# Patient Record
Sex: MALE | Race: WHITE | NOT HISPANIC OR LATINO | Employment: OTHER | URBAN - NONMETROPOLITAN AREA
[De-identification: names, ages, dates, MRNs, and addresses within clinical notes are randomized per-mention and may not be internally consistent; named-entity substitution may affect disease eponyms.]

---

## 2020-07-31 ENCOUNTER — HOSPITAL ENCOUNTER (OUTPATIENT)
Dept: GENERAL RADIOLOGY | Facility: HOSPITAL | Age: 75
Discharge: HOME | End: 2020-07-31
Admitting: ORTHOPAEDIC SURGERY

## 2020-07-31 ENCOUNTER — TRANSCRIBE ORDERS (OUTPATIENT)
Dept: ADMINISTRATIVE | Facility: HOSPITAL | Age: 75
End: 2020-07-31

## 2020-07-31 ENCOUNTER — APPOINTMENT (OUTPATIENT)
Dept: PREADMISSION TESTING | Facility: HOSPITAL | Age: 75
End: 2020-07-31

## 2020-07-31 VITALS
HEART RATE: 74 BPM | RESPIRATION RATE: 18 BRPM | HEIGHT: 72 IN | SYSTOLIC BLOOD PRESSURE: 131 MMHG | WEIGHT: 309.53 LBS | BODY MASS INDEX: 41.92 KG/M2 | OXYGEN SATURATION: 95 % | DIASTOLIC BLOOD PRESSURE: 74 MMHG

## 2020-07-31 DIAGNOSIS — Z01.818 PRE-OP TESTING: Primary | ICD-10-CM

## 2020-07-31 LAB
ALBUMIN SERPL-MCNC: 4.4 G/DL (ref 3.5–5.2)
ALBUMIN/GLOB SERPL: 1.4 G/DL
ALP SERPL-CCNC: 44 U/L (ref 39–117)
ALT SERPL W P-5'-P-CCNC: 11 U/L (ref 1–41)
ANION GAP SERPL CALCULATED.3IONS-SCNC: 12 MMOL/L (ref 5–15)
APTT PPP: 37.7 SECONDS (ref 24.1–35)
AST SERPL-CCNC: 20 U/L (ref 1–40)
BACTERIA UR QL AUTO: ABNORMAL /HPF
BILIRUB SERPL-MCNC: 0.5 MG/DL (ref 0–1.2)
BILIRUB UR QL STRIP: NEGATIVE
BUN SERPL-MCNC: 14 MG/DL (ref 8–23)
BUN/CREAT SERPL: 10.4 (ref 7–25)
CALCIUM SPEC-SCNC: 9.8 MG/DL (ref 8.6–10.5)
CHLORIDE SERPL-SCNC: 100 MMOL/L (ref 98–107)
CLARITY UR: CLEAR
CO2 SERPL-SCNC: 31 MMOL/L (ref 22–29)
COLOR UR: YELLOW
CREAT SERPL-MCNC: 1.35 MG/DL (ref 0.76–1.27)
DEPRECATED RDW RBC AUTO: 43.1 FL (ref 37–54)
ERYTHROCYTE [DISTWIDTH] IN BLOOD BY AUTOMATED COUNT: 12.4 % (ref 12.3–15.4)
GFR SERPL CREATININE-BSD FRML MDRD: 52 ML/MIN/1.73
GLOBULIN UR ELPH-MCNC: 3.2 GM/DL
GLUCOSE SERPL-MCNC: 127 MG/DL (ref 65–99)
GLUCOSE UR STRIP-MCNC: NEGATIVE MG/DL
HCT VFR BLD AUTO: 41.8 % (ref 37.5–51)
HGB BLD-MCNC: 13.3 G/DL (ref 13–17.7)
HGB UR QL STRIP.AUTO: ABNORMAL
HYALINE CASTS UR QL AUTO: ABNORMAL /LPF
INR PPP: 1.91 (ref 0.91–1.09)
KETONES UR QL STRIP: NEGATIVE
LEUKOCYTE ESTERASE UR QL STRIP.AUTO: ABNORMAL
MCH RBC QN AUTO: 30.1 PG (ref 26.6–33)
MCHC RBC AUTO-ENTMCNC: 31.8 G/DL (ref 31.5–35.7)
MCV RBC AUTO: 94.6 FL (ref 79–97)
NITRITE UR QL STRIP: NEGATIVE
PH UR STRIP.AUTO: 6.5 [PH] (ref 5–8)
PLATELET # BLD AUTO: 265 10*3/MM3 (ref 140–450)
PMV BLD AUTO: 11.4 FL (ref 6–12)
POTASSIUM SERPL-SCNC: 4.4 MMOL/L (ref 3.5–5.2)
PROT SERPL-MCNC: 7.6 G/DL (ref 6–8.5)
PROT UR QL STRIP: NEGATIVE
PROTHROMBIN TIME: 21.7 SECONDS (ref 11.9–14.6)
RBC # BLD AUTO: 4.42 10*6/MM3 (ref 4.14–5.8)
RBC # UR: ABNORMAL /HPF
REF LAB TEST METHOD: ABNORMAL
SODIUM SERPL-SCNC: 143 MMOL/L (ref 136–145)
SP GR UR STRIP: 1.01 (ref 1–1.03)
SQUAMOUS #/AREA URNS HPF: ABNORMAL /HPF
UROBILINOGEN UR QL STRIP: ABNORMAL
WBC # BLD AUTO: 11.87 10*3/MM3 (ref 3.4–10.8)
WBC UR QL AUTO: ABNORMAL /HPF

## 2020-07-31 PROCEDURE — 80053 COMPREHEN METABOLIC PANEL: CPT | Performed by: ORTHOPAEDIC SURGERY

## 2020-07-31 PROCEDURE — 85730 THROMBOPLASTIN TIME PARTIAL: CPT | Performed by: ORTHOPAEDIC SURGERY

## 2020-07-31 PROCEDURE — 87077 CULTURE AEROBIC IDENTIFY: CPT | Performed by: ORTHOPAEDIC SURGERY

## 2020-07-31 PROCEDURE — 81001 URINALYSIS AUTO W/SCOPE: CPT | Performed by: ORTHOPAEDIC SURGERY

## 2020-07-31 PROCEDURE — 85027 COMPLETE CBC AUTOMATED: CPT | Performed by: ORTHOPAEDIC SURGERY

## 2020-07-31 PROCEDURE — 87186 SC STD MICRODIL/AGAR DIL: CPT | Performed by: ORTHOPAEDIC SURGERY

## 2020-07-31 PROCEDURE — 87086 URINE CULTURE/COLONY COUNT: CPT | Performed by: ORTHOPAEDIC SURGERY

## 2020-07-31 PROCEDURE — 85610 PROTHROMBIN TIME: CPT | Performed by: ORTHOPAEDIC SURGERY

## 2020-07-31 PROCEDURE — 71045 X-RAY EXAM CHEST 1 VIEW: CPT

## 2020-07-31 PROCEDURE — 93010 ELECTROCARDIOGRAM REPORT: CPT | Performed by: INTERNAL MEDICINE

## 2020-07-31 PROCEDURE — 36415 COLL VENOUS BLD VENIPUNCTURE: CPT

## 2020-07-31 PROCEDURE — 93005 ELECTROCARDIOGRAM TRACING: CPT

## 2020-07-31 RX ORDER — ASCORBIC ACID 500 MG
500 TABLET ORAL DAILY
COMMUNITY

## 2020-07-31 RX ORDER — POTASSIUM CHLORIDE 1.5 G/1.77G
20 POWDER, FOR SOLUTION ORAL DAILY
COMMUNITY

## 2020-07-31 RX ORDER — FUROSEMIDE 40 MG/1
40 TABLET ORAL 2 TIMES DAILY
COMMUNITY

## 2020-07-31 RX ORDER — GUAIFENESIN 600 MG/1
1200 TABLET, EXTENDED RELEASE ORAL DAILY
COMMUNITY

## 2020-07-31 RX ORDER — LEVOTHYROXINE SODIUM 175 UG/1
175 TABLET ORAL DAILY
COMMUNITY

## 2020-07-31 RX ORDER — GLIPIZIDE 5 MG/1
5 TABLET ORAL DAILY
COMMUNITY

## 2020-07-31 RX ORDER — MONTELUKAST SODIUM 10 MG/1
10 TABLET ORAL NIGHTLY
COMMUNITY

## 2020-07-31 RX ORDER — ASPIRIN 81 MG/1
81 TABLET, CHEWABLE ORAL DAILY
COMMUNITY

## 2020-07-31 RX ORDER — WARFARIN SODIUM 5 MG/1
5 TABLET ORAL
COMMUNITY

## 2020-07-31 RX ORDER — ATORVASTATIN CALCIUM 10 MG/1
10 TABLET, FILM COATED ORAL DAILY
COMMUNITY

## 2020-07-31 RX ORDER — METOPROLOL SUCCINATE 100 MG/1
100 TABLET, EXTENDED RELEASE ORAL 2 TIMES DAILY
COMMUNITY

## 2020-07-31 RX ORDER — ERGOCALCIFEROL 1.25 MG/1
50000 CAPSULE ORAL WEEKLY
COMMUNITY

## 2020-07-31 RX ORDER — AMOXICILLIN 500 MG
1200 CAPSULE ORAL DAILY
COMMUNITY

## 2020-07-31 RX ORDER — OXYCODONE HYDROCHLORIDE AND ACETAMINOPHEN 5; 325 MG/1; MG/1
1 TABLET ORAL EVERY 6 HOURS PRN
COMMUNITY
End: 2020-08-09 | Stop reason: HOSPADM

## 2020-08-02 LAB — BACTERIA SPEC AEROBE CULT: ABNORMAL

## 2020-08-04 ENCOUNTER — LAB (OUTPATIENT)
Dept: LAB | Facility: HOSPITAL | Age: 75
End: 2020-08-04

## 2020-08-04 PROCEDURE — C9803 HOPD COVID-19 SPEC COLLECT: HCPCS | Performed by: ORTHOPAEDIC SURGERY

## 2020-08-04 PROCEDURE — U0003 INFECTIOUS AGENT DETECTION BY NUCLEIC ACID (DNA OR RNA); SEVERE ACUTE RESPIRATORY SYNDROME CORONAVIRUS 2 (SARS-COV-2) (CORONAVIRUS DISEASE [COVID-19]), AMPLIFIED PROBE TECHNIQUE, MAKING USE OF HIGH THROUGHPUT TECHNOLOGIES AS DESCRIBED BY CMS-2020-01-R: HCPCS | Performed by: ORTHOPAEDIC SURGERY

## 2020-08-05 LAB
COVID LABCORP PRIORITY: NORMAL
SARS-COV-2 RNA RESP QL NAA+PROBE: NOT DETECTED

## 2020-08-07 ENCOUNTER — ANESTHESIA EVENT (OUTPATIENT)
Dept: PERIOP | Facility: HOSPITAL | Age: 75
End: 2020-08-07

## 2020-08-07 ENCOUNTER — ANESTHESIA (OUTPATIENT)
Dept: PERIOP | Facility: HOSPITAL | Age: 75
End: 2020-08-07

## 2020-08-07 ENCOUNTER — APPOINTMENT (OUTPATIENT)
Dept: GENERAL RADIOLOGY | Facility: HOSPITAL | Age: 75
End: 2020-08-07

## 2020-08-07 ENCOUNTER — HOSPITAL ENCOUNTER (INPATIENT)
Facility: HOSPITAL | Age: 75
LOS: 2 days | Discharge: HOME | End: 2020-08-09
Attending: ORTHOPAEDIC SURGERY | Admitting: ORTHOPAEDIC SURGERY

## 2020-08-07 DIAGNOSIS — M54.50 LUMBAR BACK PAIN: Primary | ICD-10-CM

## 2020-08-07 DIAGNOSIS — Z78.9 IMPAIRED MOBILITY AND ADLS: ICD-10-CM

## 2020-08-07 DIAGNOSIS — Z74.09 IMPAIRED MOBILITY AND ADLS: ICD-10-CM

## 2020-08-07 DIAGNOSIS — R26.9 GAIT ABNORMALITY: ICD-10-CM

## 2020-08-07 PROBLEM — M54.16 LUMBAR RADICULOPATHY: Status: ACTIVE | Noted: 2020-08-07

## 2020-08-07 PROBLEM — M51.36 DDD (DEGENERATIVE DISC DISEASE), LUMBAR: Status: ACTIVE | Noted: 2020-08-07

## 2020-08-07 LAB
ABO GROUP BLD: NORMAL
BLD GP AB SCN SERPL QL: NEGATIVE
GLUCOSE BLDC GLUCOMTR-MCNC: 135 MG/DL (ref 70–130)
INR PPP: 1.27 (ref 0.91–1.09)
PROTHROMBIN TIME: 15.5 SECONDS (ref 11.9–14.6)
RH BLD: NEGATIVE
T&S EXPIRATION DATE: NORMAL

## 2020-08-07 PROCEDURE — 76000 FLUOROSCOPY <1 HR PHYS/QHP: CPT

## 2020-08-07 PROCEDURE — 25010000002 FENTANYL CITRATE (PF) 100 MCG/2ML SOLUTION: Performed by: ANESTHESIOLOGY

## 2020-08-07 PROCEDURE — C1713 ANCHOR/SCREW BN/BN,TIS/BN: HCPCS | Performed by: ORTHOPAEDIC SURGERY

## 2020-08-07 PROCEDURE — 0SG00AJ FUSION OF LUMBAR VERTEBRAL JOINT WITH INTERBODY FUSION DEVICE, POSTERIOR APPROACH, ANTERIOR COLUMN, OPEN APPROACH: ICD-10-PCS | Performed by: ORTHOPAEDIC SURGERY

## 2020-08-07 PROCEDURE — 25010000002 HYDROMORPHONE PER 4 MG: Performed by: ANESTHESIOLOGY

## 2020-08-07 PROCEDURE — 25010000002 CEFAZOLIN PER 500 MG: Performed by: PHYSICIAN ASSISTANT

## 2020-08-07 PROCEDURE — 25010000002 PHENYLEPHRINE HCL 0.8 MG/10ML SOLUTION PREFILLED SYRINGE: Performed by: NURSE ANESTHETIST, CERTIFIED REGISTERED

## 2020-08-07 PROCEDURE — 86900 BLOOD TYPING SEROLOGIC ABO: CPT | Performed by: ORTHOPAEDIC SURGERY

## 2020-08-07 PROCEDURE — 72100 X-RAY EXAM L-S SPINE 2/3 VWS: CPT

## 2020-08-07 PROCEDURE — 86901 BLOOD TYPING SEROLOGIC RH(D): CPT | Performed by: ORTHOPAEDIC SURGERY

## 2020-08-07 PROCEDURE — 25010000002 PROPOFOL 10 MG/ML EMULSION: Performed by: NURSE ANESTHETIST, CERTIFIED REGISTERED

## 2020-08-07 PROCEDURE — 86850 RBC ANTIBODY SCREEN: CPT | Performed by: ORTHOPAEDIC SURGERY

## 2020-08-07 PROCEDURE — 25010000003 HYDROMORPHONE 1 MG/ML SOLUTION: Performed by: ORTHOPAEDIC SURGERY

## 2020-08-07 PROCEDURE — 0ST20ZZ RESECTION OF LUMBAR VERTEBRAL DISC, OPEN APPROACH: ICD-10-PCS | Performed by: ORTHOPAEDIC SURGERY

## 2020-08-07 PROCEDURE — 0SG0071 FUSION OF LUMBAR VERTEBRAL JOINT WITH AUTOLOGOUS TISSUE SUBSTITUTE, POSTERIOR APPROACH, POSTERIOR COLUMN, OPEN APPROACH: ICD-10-PCS | Performed by: ORTHOPAEDIC SURGERY

## 2020-08-07 PROCEDURE — 85610 PROTHROMBIN TIME: CPT | Performed by: ORTHOPAEDIC SURGERY

## 2020-08-07 PROCEDURE — 82962 GLUCOSE BLOOD TEST: CPT

## 2020-08-07 PROCEDURE — 25010000002 ONDANSETRON PER 1 MG: Performed by: NURSE ANESTHETIST, CERTIFIED REGISTERED

## 2020-08-07 PROCEDURE — C1769 GUIDE WIRE: HCPCS | Performed by: ORTHOPAEDIC SURGERY

## 2020-08-07 PROCEDURE — 25010000002 ONDANSETRON PER 1 MG: Performed by: ANESTHESIOLOGY

## 2020-08-07 PROCEDURE — 94799 UNLISTED PULMONARY SVC/PX: CPT

## 2020-08-07 DEVICE — SET SCREW
Type: IMPLANTABLE DEVICE | Site: SPINE LUMBAR | Status: FUNCTIONAL
Brand: INVICTUS

## 2020-08-07 DEVICE — TI MIS LORDOTIC ROD, 5.5 MM X 40 MM
Type: IMPLANTABLE DEVICE | Site: SPINE LUMBAR | Status: FUNCTIONAL
Brand: INVICTUS

## 2020-08-07 DEVICE — ALLOGRFT FLD BIOBURST 1ML: Type: IMPLANTABLE DEVICE | Site: SPINE LUMBAR | Status: FUNCTIONAL

## 2020-08-07 DEVICE — CANNULATED EXTENDED TAB POLYAXIAL REDUCTION SCREW, 6.5 MM X 55 MM
Type: IMPLANTABLE DEVICE | Site: SPINE LUMBAR | Status: FUNCTIONAL
Brand: INVICTUS

## 2020-08-07 DEVICE — IMPLANTABLE DEVICE: Type: IMPLANTABLE DEVICE | Site: SPINE LUMBAR | Status: FUNCTIONAL

## 2020-08-07 DEVICE — BONE FILLER VOID NANOSS BIOACTIVE 3D 20CC: Type: IMPLANTABLE DEVICE | Site: SPINE LUMBAR | Status: FUNCTIONAL

## 2020-08-07 RX ORDER — SODIUM CHLORIDE 9 MG/ML
INJECTION, SOLUTION INTRAVENOUS AS NEEDED
Status: DISCONTINUED | OUTPATIENT
Start: 2020-08-07 | End: 2020-08-09 | Stop reason: HOSPADM

## 2020-08-07 RX ORDER — HYDROMORPHONE HYDROCHLORIDE 1 MG/ML
0.25 INJECTION, SOLUTION INTRAMUSCULAR; INTRAVENOUS; SUBCUTANEOUS
Status: DISCONTINUED | OUTPATIENT
Start: 2020-08-07 | End: 2020-08-07 | Stop reason: HOSPADM

## 2020-08-07 RX ORDER — PHENYLEPHRINE HCL IN 0.9% NACL 0.8MG/10ML
SYRINGE (ML) INTRAVENOUS AS NEEDED
Status: DISCONTINUED | OUTPATIENT
Start: 2020-08-07 | End: 2020-08-07 | Stop reason: SURG

## 2020-08-07 RX ORDER — ROCURONIUM BROMIDE 10 MG/ML
INJECTION, SOLUTION INTRAVENOUS AS NEEDED
Status: DISCONTINUED | OUTPATIENT
Start: 2020-08-07 | End: 2020-08-07 | Stop reason: SURG

## 2020-08-07 RX ORDER — SODIUM CHLORIDE 0.9 % (FLUSH) 0.9 %
3 SYRINGE (ML) INJECTION EVERY 12 HOURS SCHEDULED
Status: DISCONTINUED | OUTPATIENT
Start: 2020-08-07 | End: 2020-08-07 | Stop reason: HOSPADM

## 2020-08-07 RX ORDER — POTASSIUM CHLORIDE 1.5 G/1.77G
20 POWDER, FOR SOLUTION ORAL DAILY
Status: DISCONTINUED | OUTPATIENT
Start: 2020-08-07 | End: 2020-08-09 | Stop reason: HOSPADM

## 2020-08-07 RX ORDER — SODIUM CHLORIDE, SODIUM LACTATE, POTASSIUM CHLORIDE, CALCIUM CHLORIDE 600; 310; 30; 20 MG/100ML; MG/100ML; MG/100ML; MG/100ML
1000 INJECTION, SOLUTION INTRAVENOUS CONTINUOUS
Status: DISCONTINUED | OUTPATIENT
Start: 2020-08-07 | End: 2020-08-07 | Stop reason: HOSPADM

## 2020-08-07 RX ORDER — ONDANSETRON 2 MG/ML
4 INJECTION INTRAMUSCULAR; INTRAVENOUS EVERY 6 HOURS PRN
Status: DISCONTINUED | OUTPATIENT
Start: 2020-08-07 | End: 2020-08-09 | Stop reason: HOSPADM

## 2020-08-07 RX ORDER — MIDAZOLAM HYDROCHLORIDE 1 MG/ML
1 INJECTION INTRAMUSCULAR; INTRAVENOUS
Status: DISCONTINUED | OUTPATIENT
Start: 2020-08-07 | End: 2020-08-07 | Stop reason: HOSPADM

## 2020-08-07 RX ORDER — MONTELUKAST SODIUM 10 MG/1
10 TABLET ORAL NIGHTLY
Status: DISCONTINUED | OUTPATIENT
Start: 2020-08-07 | End: 2020-08-09 | Stop reason: HOSPADM

## 2020-08-07 RX ORDER — ACETAMINOPHEN 500 MG
1000 TABLET ORAL ONCE
Status: COMPLETED | OUTPATIENT
Start: 2020-08-07 | End: 2020-08-07

## 2020-08-07 RX ORDER — OXYCODONE AND ACETAMINOPHEN 10; 325 MG/1; MG/1
1 TABLET ORAL ONCE AS NEEDED
Status: DISCONTINUED | OUTPATIENT
Start: 2020-08-07 | End: 2020-08-07 | Stop reason: HOSPADM

## 2020-08-07 RX ORDER — METOPROLOL SUCCINATE 100 MG/1
100 TABLET, EXTENDED RELEASE ORAL 2 TIMES DAILY
Status: DISCONTINUED | OUTPATIENT
Start: 2020-08-07 | End: 2020-08-09 | Stop reason: HOSPADM

## 2020-08-07 RX ORDER — PROPOFOL 10 MG/ML
VIAL (ML) INTRAVENOUS AS NEEDED
Status: DISCONTINUED | OUTPATIENT
Start: 2020-08-07 | End: 2020-08-07 | Stop reason: SURG

## 2020-08-07 RX ORDER — CEFAZOLIN SODIUM IN 0.9 % NACL 3 G/100 ML
3 INTRAVENOUS SOLUTION, PIGGYBACK (ML) INTRAVENOUS ONCE
Status: COMPLETED | OUTPATIENT
Start: 2020-08-07 | End: 2020-08-07

## 2020-08-07 RX ORDER — OXYCODONE AND ACETAMINOPHEN 7.5; 325 MG/1; MG/1
2 TABLET ORAL ONCE AS NEEDED
Status: DISCONTINUED | OUTPATIENT
Start: 2020-08-07 | End: 2020-08-07 | Stop reason: HOSPADM

## 2020-08-07 RX ORDER — PROMETHAZINE HYDROCHLORIDE 25 MG/ML
12.5 INJECTION, SOLUTION INTRAMUSCULAR; INTRAVENOUS EVERY 6 HOURS PRN
Status: DISCONTINUED | OUTPATIENT
Start: 2020-08-07 | End: 2020-08-09 | Stop reason: HOSPADM

## 2020-08-07 RX ORDER — FLUMAZENIL 0.1 MG/ML
0.2 INJECTION INTRAVENOUS AS NEEDED
Status: DISCONTINUED | OUTPATIENT
Start: 2020-08-07 | End: 2020-08-07 | Stop reason: HOSPADM

## 2020-08-07 RX ORDER — EPHEDRINE SULFATE 50 MG/ML
INJECTION, SOLUTION INTRAVENOUS AS NEEDED
Status: DISCONTINUED | OUTPATIENT
Start: 2020-08-07 | End: 2020-08-07 | Stop reason: SURG

## 2020-08-07 RX ORDER — MAGNESIUM HYDROXIDE 1200 MG/15ML
LIQUID ORAL AS NEEDED
Status: DISCONTINUED | OUTPATIENT
Start: 2020-08-07 | End: 2020-08-09 | Stop reason: HOSPADM

## 2020-08-07 RX ORDER — ACETAMINOPHEN 325 MG/1
650 TABLET ORAL EVERY 4 HOURS PRN
Status: DISCONTINUED | OUTPATIENT
Start: 2020-08-07 | End: 2020-08-09 | Stop reason: HOSPADM

## 2020-08-07 RX ORDER — AMOXICILLIN 250 MG
1 CAPSULE ORAL 2 TIMES DAILY
Status: DISCONTINUED | OUTPATIENT
Start: 2020-08-07 | End: 2020-08-09 | Stop reason: HOSPADM

## 2020-08-07 RX ORDER — ONDANSETRON 4 MG/1
4 TABLET, FILM COATED ORAL EVERY 6 HOURS PRN
Status: DISCONTINUED | OUTPATIENT
Start: 2020-08-07 | End: 2020-08-09 | Stop reason: HOSPADM

## 2020-08-07 RX ORDER — SODIUM CHLORIDE 0.9 % (FLUSH) 0.9 %
10 SYRINGE (ML) INJECTION AS NEEDED
Status: DISCONTINUED | OUTPATIENT
Start: 2020-08-07 | End: 2020-08-07 | Stop reason: HOSPADM

## 2020-08-07 RX ORDER — SODIUM CHLORIDE 0.9 % (FLUSH) 0.9 %
3 SYRINGE (ML) INJECTION AS NEEDED
Status: DISCONTINUED | OUTPATIENT
Start: 2020-08-07 | End: 2020-08-07 | Stop reason: HOSPADM

## 2020-08-07 RX ORDER — SODIUM CHLORIDE, SODIUM LACTATE, POTASSIUM CHLORIDE, CALCIUM CHLORIDE 600; 310; 30; 20 MG/100ML; MG/100ML; MG/100ML; MG/100ML
100 INJECTION, SOLUTION INTRAVENOUS CONTINUOUS PRN
Status: DISCONTINUED | OUTPATIENT
Start: 2020-08-07 | End: 2020-08-07 | Stop reason: HOSPADM

## 2020-08-07 RX ORDER — FUROSEMIDE 40 MG/1
40 TABLET ORAL
Status: DISCONTINUED | OUTPATIENT
Start: 2020-08-07 | End: 2020-08-09 | Stop reason: HOSPADM

## 2020-08-07 RX ORDER — OXYCODONE AND ACETAMINOPHEN 7.5; 325 MG/1; MG/1
2 TABLET ORAL EVERY 4 HOURS PRN
Status: DISCONTINUED | OUTPATIENT
Start: 2020-08-07 | End: 2020-08-09 | Stop reason: HOSPADM

## 2020-08-07 RX ORDER — OXYCODONE HCL 20 MG/1
20 TABLET, FILM COATED, EXTENDED RELEASE ORAL ONCE
Status: COMPLETED | OUTPATIENT
Start: 2020-08-07 | End: 2020-08-07

## 2020-08-07 RX ORDER — NALOXONE HCL 0.4 MG/ML
0.04 VIAL (ML) INJECTION AS NEEDED
Status: DISCONTINUED | OUTPATIENT
Start: 2020-08-07 | End: 2020-08-07 | Stop reason: HOSPADM

## 2020-08-07 RX ORDER — SODIUM CHLORIDE 0.9 % (FLUSH) 0.9 %
3-10 SYRINGE (ML) INJECTION AS NEEDED
Status: DISCONTINUED | OUTPATIENT
Start: 2020-08-07 | End: 2020-08-07 | Stop reason: HOSPADM

## 2020-08-07 RX ORDER — FENTANYL CITRATE 50 UG/ML
25 INJECTION, SOLUTION INTRAMUSCULAR; INTRAVENOUS
Status: DISCONTINUED | OUTPATIENT
Start: 2020-08-07 | End: 2020-08-07 | Stop reason: HOSPADM

## 2020-08-07 RX ORDER — NALOXONE HCL 0.4 MG/ML
0.4 VIAL (ML) INJECTION
Status: DISCONTINUED | OUTPATIENT
Start: 2020-08-07 | End: 2020-08-09 | Stop reason: HOSPADM

## 2020-08-07 RX ORDER — SODIUM CHLORIDE 0.9 % (FLUSH) 0.9 %
10 SYRINGE (ML) INJECTION AS NEEDED
Status: DISCONTINUED | OUTPATIENT
Start: 2020-08-07 | End: 2020-08-09 | Stop reason: HOSPADM

## 2020-08-07 RX ORDER — OXYCODONE AND ACETAMINOPHEN 7.5; 325 MG/1; MG/1
1 TABLET ORAL EVERY 4 HOURS PRN
Status: DISCONTINUED | OUTPATIENT
Start: 2020-08-07 | End: 2020-08-09 | Stop reason: HOSPADM

## 2020-08-07 RX ORDER — SODIUM CHLORIDE, SODIUM LACTATE, POTASSIUM CHLORIDE, CALCIUM CHLORIDE 600; 310; 30; 20 MG/100ML; MG/100ML; MG/100ML; MG/100ML
100 INJECTION, SOLUTION INTRAVENOUS CONTINUOUS
Status: DISCONTINUED | OUTPATIENT
Start: 2020-08-07 | End: 2020-08-07 | Stop reason: HOSPADM

## 2020-08-07 RX ORDER — BUPIVACAINE HCL/0.9 % NACL/PF 0.1 %
2 PLASTIC BAG, INJECTION (ML) EPIDURAL EVERY 8 HOURS
Status: COMPLETED | OUTPATIENT
Start: 2020-08-07 | End: 2020-08-08

## 2020-08-07 RX ORDER — ASCORBIC ACID 500 MG
1000 TABLET ORAL DAILY
Status: DISCONTINUED | OUTPATIENT
Start: 2020-08-07 | End: 2020-08-09 | Stop reason: HOSPADM

## 2020-08-07 RX ORDER — ONDANSETRON 2 MG/ML
4 INJECTION INTRAMUSCULAR; INTRAVENOUS AS NEEDED
Status: DISCONTINUED | OUTPATIENT
Start: 2020-08-07 | End: 2020-08-07 | Stop reason: HOSPADM

## 2020-08-07 RX ORDER — LIDOCAINE HYDROCHLORIDE 10 MG/ML
0.5 INJECTION, SOLUTION EPIDURAL; INFILTRATION; INTRACAUDAL; PERINEURAL ONCE AS NEEDED
Status: DISCONTINUED | OUTPATIENT
Start: 2020-08-07 | End: 2020-08-07 | Stop reason: HOSPADM

## 2020-08-07 RX ORDER — DEXAMETHASONE SODIUM PHOSPHATE 4 MG/ML
INJECTION, SOLUTION INTRA-ARTICULAR; INTRALESIONAL; INTRAMUSCULAR; INTRAVENOUS; SOFT TISSUE AS NEEDED
Status: DISCONTINUED | OUTPATIENT
Start: 2020-08-07 | End: 2020-08-07

## 2020-08-07 RX ORDER — LABETALOL HYDROCHLORIDE 5 MG/ML
5 INJECTION, SOLUTION INTRAVENOUS
Status: DISCONTINUED | OUTPATIENT
Start: 2020-08-07 | End: 2020-08-07 | Stop reason: HOSPADM

## 2020-08-07 RX ORDER — SUFENTANIL CITRATE 50 UG/ML
INJECTION EPIDURAL; INTRAVENOUS AS NEEDED
Status: DISCONTINUED | OUTPATIENT
Start: 2020-08-07 | End: 2020-08-07 | Stop reason: SURG

## 2020-08-07 RX ORDER — ATORVASTATIN CALCIUM 10 MG/1
10 TABLET, FILM COATED ORAL NIGHTLY
Status: DISCONTINUED | OUTPATIENT
Start: 2020-08-07 | End: 2020-08-09 | Stop reason: HOSPADM

## 2020-08-07 RX ORDER — SODIUM CHLORIDE 9 MG/ML
100 INJECTION, SOLUTION INTRAVENOUS CONTINUOUS
Status: DISCONTINUED | OUTPATIENT
Start: 2020-08-07 | End: 2020-08-09 | Stop reason: HOSPADM

## 2020-08-07 RX ORDER — GLIPIZIDE 5 MG/1
5 TABLET ORAL DAILY
Status: DISCONTINUED | OUTPATIENT
Start: 2020-08-07 | End: 2020-08-09 | Stop reason: HOSPADM

## 2020-08-07 RX ORDER — SODIUM CHLORIDE 0.9 % (FLUSH) 0.9 %
3 SYRINGE (ML) INJECTION EVERY 12 HOURS SCHEDULED
Status: DISCONTINUED | OUTPATIENT
Start: 2020-08-07 | End: 2020-08-09 | Stop reason: HOSPADM

## 2020-08-07 RX ORDER — DIAZEPAM 5 MG/1
5 TABLET ORAL EVERY 6 HOURS PRN
Status: DISCONTINUED | OUTPATIENT
Start: 2020-08-07 | End: 2020-08-09 | Stop reason: HOSPADM

## 2020-08-07 RX ORDER — SODIUM CHLORIDE 0.9 % (FLUSH) 0.9 %
10 SYRINGE (ML) INJECTION EVERY 12 HOURS SCHEDULED
Status: DISCONTINUED | OUTPATIENT
Start: 2020-08-07 | End: 2020-08-07 | Stop reason: HOSPADM

## 2020-08-07 RX ORDER — ONDANSETRON 2 MG/ML
INJECTION INTRAMUSCULAR; INTRAVENOUS AS NEEDED
Status: DISCONTINUED | OUTPATIENT
Start: 2020-08-07 | End: 2020-08-07 | Stop reason: SURG

## 2020-08-07 RX ORDER — GUAIFENESIN 600 MG/1
1200 TABLET, EXTENDED RELEASE ORAL DAILY
Status: DISCONTINUED | OUTPATIENT
Start: 2020-08-07 | End: 2020-08-09 | Stop reason: HOSPADM

## 2020-08-07 RX ADMIN — SODIUM CHLORIDE 100 ML/HR: 9 INJECTION, SOLUTION INTRAVENOUS at 17:17

## 2020-08-07 RX ADMIN — ONDANSETRON HYDROCHLORIDE 4 MG: 2 SOLUTION INTRAMUSCULAR; INTRAVENOUS at 16:02

## 2020-08-07 RX ADMIN — FENTANYL CITRATE 25 MCG: 50 INJECTION, SOLUTION INTRAMUSCULAR; INTRAVENOUS at 16:07

## 2020-08-07 RX ADMIN — Medication 160 MCG: at 12:14

## 2020-08-07 RX ADMIN — HYDROMORPHONE HYDROCHLORIDE 1 MG: 1 INJECTION, SOLUTION INTRAMUSCULAR; INTRAVENOUS; SUBCUTANEOUS at 20:18

## 2020-08-07 RX ADMIN — CEFAZOLIN 3 G: 1 INJECTION, POWDER, FOR SOLUTION INTRAMUSCULAR; INTRAVENOUS; PARENTERAL at 12:42

## 2020-08-07 RX ADMIN — LIDOCAINE HYDROCHLORIDE 100 MG: 20 INJECTION, SOLUTION INTRAVENOUS at 12:12

## 2020-08-07 RX ADMIN — FENTANYL CITRATE 25 MCG: 50 INJECTION, SOLUTION INTRAMUSCULAR; INTRAVENOUS at 16:02

## 2020-08-07 RX ADMIN — HYDROMORPHONE HYDROCHLORIDE 1 MG: 1 INJECTION, SOLUTION INTRAMUSCULAR; INTRAVENOUS; SUBCUTANEOUS at 17:52

## 2020-08-07 RX ADMIN — SUFENTANIL CITRATE 10 MCG: 50 INJECTION EPIDURAL; INTRAVENOUS at 13:17

## 2020-08-07 RX ADMIN — ROCURONIUM BROMIDE 20 MG: 10 INJECTION INTRAVENOUS at 12:57

## 2020-08-07 RX ADMIN — METOPROLOL SUCCINATE 100 MG: 100 TABLET, EXTENDED RELEASE ORAL at 20:17

## 2020-08-07 RX ADMIN — FENTANYL CITRATE 25 MCG: 50 INJECTION, SOLUTION INTRAMUSCULAR; INTRAVENOUS at 16:22

## 2020-08-07 RX ADMIN — ATORVASTATIN CALCIUM 10 MG: 10 TABLET, FILM COATED ORAL at 20:17

## 2020-08-07 RX ADMIN — SUFENTANIL CITRATE 20 MCG: 50 INJECTION EPIDURAL; INTRAVENOUS at 13:21

## 2020-08-07 RX ADMIN — ONDANSETRON HYDROCHLORIDE 4 MG: 2 SOLUTION INTRAMUSCULAR; INTRAVENOUS at 15:03

## 2020-08-07 RX ADMIN — FENTANYL CITRATE 25 MCG: 50 INJECTION, SOLUTION INTRAMUSCULAR; INTRAVENOUS at 16:12

## 2020-08-07 RX ADMIN — SODIUM CHLORIDE, POTASSIUM CHLORIDE, SODIUM LACTATE AND CALCIUM CHLORIDE 1000 ML: 600; 310; 30; 20 INJECTION, SOLUTION INTRAVENOUS at 09:10

## 2020-08-07 RX ADMIN — VASOPRESSIN 1 ML: 20 INJECTION INTRAVENOUS at 12:19

## 2020-08-07 RX ADMIN — SODIUM CHLORIDE, PRESERVATIVE FREE 3 ML: 5 INJECTION INTRAVENOUS at 20:20

## 2020-08-07 RX ADMIN — OXYCODONE HYDROCHLORIDE 20 MG: 20 TABLET, FILM COATED, EXTENDED RELEASE ORAL at 09:04

## 2020-08-07 RX ADMIN — FENTANYL CITRATE 25 MCG: 50 INJECTION, SOLUTION INTRAMUSCULAR; INTRAVENOUS at 15:57

## 2020-08-07 RX ADMIN — SODIUM CHLORIDE, POTASSIUM CHLORIDE, SODIUM LACTATE AND CALCIUM CHLORIDE 1000 ML: 600; 310; 30; 20 INJECTION, SOLUTION INTRAVENOUS at 09:02

## 2020-08-07 RX ADMIN — SUFENTANIL CITRATE 20 MCG: 50 INJECTION EPIDURAL; INTRAVENOUS at 12:09

## 2020-08-07 RX ADMIN — SODIUM CHLORIDE, POTASSIUM CHLORIDE, SODIUM LACTATE AND CALCIUM CHLORIDE: 600; 310; 30; 20 INJECTION, SOLUTION INTRAVENOUS at 13:21

## 2020-08-07 RX ADMIN — FENTANYL CITRATE 25 MCG: 50 INJECTION, SOLUTION INTRAMUSCULAR; INTRAVENOUS at 15:52

## 2020-08-07 RX ADMIN — MONTELUKAST 10 MG: 10 TABLET ORAL at 20:17

## 2020-08-07 RX ADMIN — VASOPRESSIN 1 ML: 20 INJECTION INTRAVENOUS at 12:17

## 2020-08-07 RX ADMIN — ACETAMINOPHEN 1000 MG: 500 TABLET, FILM COATED ORAL at 09:41

## 2020-08-07 RX ADMIN — FENTANYL CITRATE 25 MCG: 50 INJECTION, SOLUTION INTRAMUSCULAR; INTRAVENOUS at 16:32

## 2020-08-07 RX ADMIN — PROPOFOL 150 MG: 10 INJECTION, EMULSION INTRAVENOUS at 12:12

## 2020-08-07 RX ADMIN — HYDROMORPHONE HYDROCHLORIDE 0.25 MG: 1 INJECTION, SOLUTION INTRAMUSCULAR; INTRAVENOUS; SUBCUTANEOUS at 16:17

## 2020-08-07 RX ADMIN — ROCURONIUM BROMIDE 50 MG: 10 INJECTION INTRAVENOUS at 12:12

## 2020-08-07 RX ADMIN — CEFAZOLIN SODIUM 2 G: 10 INJECTION, POWDER, FOR SOLUTION INTRAVENOUS at 20:18

## 2020-08-07 RX ADMIN — HYDROMORPHONE HYDROCHLORIDE 0.25 MG: 1 INJECTION, SOLUTION INTRAMUSCULAR; INTRAVENOUS; SUBCUTANEOUS at 16:27

## 2020-08-07 RX ADMIN — EPHEDRINE SULFATE 50 MG: 50 INJECTION INTRAVENOUS at 12:20

## 2020-08-07 RX ADMIN — DOCUSATE SODIUM 50 MG AND SENNOSIDES 8.6 MG 1 TABLET: 8.6; 5 TABLET, FILM COATED ORAL at 20:17

## 2020-08-07 NOTE — ANESTHESIA POSTPROCEDURE EVALUATION
Patient: Shabbir Solorio    Procedure Summary     Date:  08/07/20 Room / Location:   [facility] OR  /  [facility]    Anesthesia Start:  1209 Anesthesia Stop:  1534    Procedure:  RIGHT L3-4, HEMILAMINECTOMY, FACETECTOMY, DISCECTOMY, TRANSFORAMINAL LUMBAR INTERBODY FUSION, POSTERIOR SPINAL FUSION WITH INSTRUMENTATION. (Right Spine Lumbar) Diagnosis:  (M51.26)    Surgeon:  MIKI Cloud MD Provider:  Nakita Jerome CRNA    Anesthesia Type:  general ASA Status:  3          Anesthesia Type: general    Vitals  Vitals Value Taken Time   /80 8/7/2020  4:52 PM   Temp 97.8 °F (36.6 °C) 8/7/2020  5:01 PM   Pulse 77 8/7/2020  5:01 PM   Resp 18 8/7/2020  5:01 PM   SpO2 99 % 8/7/2020  5:01 PM           Post Anesthesia Care and Evaluation    Patient location during evaluation: PACU  Patient participation: complete - patient participated  Level of consciousness: awake and awake and alert  Pain score: 0  Pain management: adequate  Airway patency: patent  Anesthetic complications: No anesthetic complications  PONV Status: none  Cardiovascular status: acceptable  Respiratory status: acceptable  Hydration status: acceptable    Comments: Patient discharged according to acceptable Tamera score per RN assessment. See nursing records for further information.     Blood pressure 139/82, pulse 92, temperature 97.6 °F (36.4 °C), temperature source Oral, resp. rate 18, SpO2 98 %.

## 2020-08-07 NOTE — ANESTHESIA PROCEDURE NOTES
Airway  Urgency: elective    Date/Time: 8/7/2020 12:13 PM  Airway not difficult    General Information and Staff    Patient location during procedure: OR  CRNA: Nakita Jerome CRNA    Indications and Patient Condition  Indications for airway management: airway protection    Preoxygenated: yes  Mask difficulty assessment: 2 - vent by mask + OA or adjuvant +/- NMBA    Final Airway Details  Final airway type: endotracheal airway      Successful airway: ETT  Cuffed: yes   Successful intubation technique: direct laryngoscopy  Facilitating devices/methods: intubating stylet  Endotracheal tube insertion site: oral  Blade: Mcwilliams  Blade size: 2  ETT size (mm): 8.0  Cormack-Lehane Classification: grade I - full view of glottis  Placement verified by: chest auscultation and capnometry   Cuff volume (mL): 8  Measured from: lips  ETT/EBT  to lips (cm): 23  Number of attempts at approach: 1  Assessment: lips, teeth, and gum same as pre-op and atraumatic intubation    Additional Comments  Easy atraumatic intubation.

## 2020-08-07 NOTE — ANESTHESIA PREPROCEDURE EVALUATION
Anesthesia Evaluation     Patient summary reviewed and Nursing notes reviewed   no history of anesthetic complications:  NPO Solid Status: > 8 hours  NPO Liquid Status: > 8 hours           Airway   Mallampati: I  TM distance: >3 FB  Neck ROM: full  No difficulty expected  Dental      Pulmonary    (+) sleep apnea (not using cpap),   (-) asthma, not a smoker  Cardiovascular   Exercise tolerance: poor (<4 METS)    ECG reviewed    (+) hypertension, valvular problems/murmurs (Pt reports h/o AVR 2017), dysrhythmias Atrial Fib, hyperlipidemia,       Neuro/Psych  (-) seizures, TIA, CVA  GI/Hepatic/Renal/Endo    (+) morbid obesity,  renal disease CRI, diabetes mellitus type 2,   (-) liver disease    Musculoskeletal     (+) back pain,   Abdominal    Substance History      OB/GYN          Other      history of cancer (bladder cancer, receiiving treatment currently) active                    Anesthesia Plan    ASA 3     general     intravenous induction     Anesthetic plan, all risks, benefits, and alternatives have been provided, discussed and informed consent has been obtained with: patient.

## 2020-08-07 NOTE — ANESTHESIA PROCEDURE NOTES
Arterial Line    Pre-sedation assessment completed: 8/7/2020 9:47 AM    Patient reassessed immediately prior to procedure    Patient location during procedure: pre-op  Start time: 8/7/2020 9:48 AM  Stop Time:8/7/2020 9:50 AM       Line placed for respiratory failure, hemodynamic monitoring and ABGs/Labs/ISTAT.  Performed By   Anesthesiologist: Lindsey Hernandez MD  Preanesthetic Checklist  Completed: patient identified, site marked, surgical consent, pre-op evaluation, timeout performed, IV checked, risks and benefits discussed and monitors and equipment checked  Arterial Line Prep   Sterile Tech: cap, gloves and mask  Prep: ChloraPrep  Patient monitoring: continuous pulse oximetry  Arterial Line Procedure   Laterality:right  Location:  radial artery  Catheter size: 20 G   Guidance: ultrasound guided  PROCEDURE NOTE/ULTRASOUND INTERPRETATION.  Using ultrasound guidance the potential vascular sites for insertion of the catheter were visualized to determine the patency of the vessel to be used for vascular access.  After selecting the appropriate site for insertion, the needle was visualized under ultrasound being inserted into the radial artery, followed by ultrasound confirmation of wire and catheter placement. There were no abnormalities seen on ultrasound; an image was taken; and the patient tolerated the procedure with no complications.   Number of attempts: 1  Successful placement: yes  Post Assessment   Dressing Type: secured with tape and wrist guard applied.   Complications no  Circ/Move/Sens Assessment: normal and unchanged.   Patient Tolerance: patient tolerated the procedure well with no apparent complications

## 2020-08-07 NOTE — OP NOTE
LUMBAR LAMINECTOMY TRANSFORAMINAL LUMBAR INTERBODY FUSION  Procedure Note    Shabbir Solorio  8/7/2020    Pre-op Diagnosis:     1. Back pain.  2. Severe right anterior thigh and leg radiculopathy.  3. Multilevel lumbar degenerative disk disease.  4. Multilevel lumbar facet arthropathy.  5. Large foraminal disk herniation , right L3-4.  6. Severe central and right-sided foraminal stenosis, L3-4.  7. Atrial fibrillation, on Coumadin.  8. History of bladder cancer.    Post-op Diagnosis:    same    Procedure/CPT® Codes:    1.  Right L3-4 hemilaminectomy decompression with complete facetectomy, neuroforaminotomy, discectomy  2.  TLIF L3-4  3.  Posterior spinal fusion L3-4  4.  Posterior spinal instrumentation L3-4 (ATEC pedicle screws and jessie)  5.  Use of PEEK interbody biomechanical device for fusion L3-4 ([employer] PEEK spacer)  6.  Use of locally obtained autograft bone for fusion L3-4  7.  Use of allograft bone matrix for fusion L3-4  8.  Use of allograft liquid for fusion L3-4 (BioBurst)  9.  Use of operating room microscope for decompression and microdissection  10.  Use of fluoroscopy for confirmation of surgical level, placement of instrumentation and PEEK spacer  11.  Intraoperative neuromonitoring with pedicle screw stimulation    Anesthesia: General     Surgeon: LIZANDRO Cloud MD     Assistant:  Imtiaz Pinon PA-C     Estimated Blood Loss: 50 mL     Complications: None     Condition: Stable to PACU.     Indications:    The patient is a 75-year-old who sees Dr. Pilo Costa for medical issues.  He presented to the office with complaints of back pain but more importantly severe right anterior thigh and leg radiculopathy.  While he was noted to have multilevel lumbar degenerative disc disease as well as multilevel lumbar facet arthropathy, more importantly he had a large foraminal disc herniation on the right side of L3-4 causing severe central and right-sided foraminal stenosis.  This was felt to be  contributing to his severe right anterior thigh and leg radiculopathy.    After failing conservative measures, it was mutually decided that surgery was the best option.  Risks benefits and complications of surgery were discussed with the patient, and the patient appeared well informed and wished to proceed.  We specifically discussed the risk of infection, blood loss, nerve root injury, CSF leak, and the possibility of incomplete resolution of symptoms.  We also discussed the possible risk of a nonunion and the potential need for additional surgery in the event of a pseudoarthrosis or hardware failure.     Operative Procedure:     After obtaining informed consent and verifying the correct level, the patient was brought to the operating room.  A general anesthetic was provided by the anesthesia service with the assistance of an endotracheal tube.  Once this was appropriately positioned and secured, the patient was carefully rotated into the prone position on a David frame.  All bony prominences were well-padded.  The lumbar region was then prepped and draped in the usual sterile fashion.  A surgical timeout was taken to confirm this was the correct patient, we were working at the correct levels, and that preoperative antibiotics were given in a timely fashion.     Using fluoroscopy for guidance, a right sided Wiltsey incision was created overlying the L3-4 interlaminar space.  Dissection was carried through subcutaneous tissues using Bovie cautery.  The fascia was divided in line with the incision and blunt dissection was carried between the multifidus and longissimus.  Dissection was carried laterally exposing the transverse process of L3 and L4.  Dissection was carried medially exposing the interlaminar space of L3-4 and self retaining retractors were placed for continuous exposure.  A forward angled curette was placed under the lamina of L3, and fluoroscopy was used to confirm that we were at the correct level.   The microscope was then positioned for the remaining portion of the procedure.    The decompression was started using a rongeur to remove most of the L3 lamina and the L3-4 facet joint.  Forward angled curettes were used to free up the remaining L3 lamina releasing the ligamentum flavum.  The remaining L3 lamina was removed using Kerrisons through the region of the pars out laterally.  The ligamentum flavum was dissected free from the underlying dura using forward angled curettes and resected using Kerrisons.  The central dural sac as well as the traversing L4 nerve root were easily identified.  We then worked superiorly continuing the hemilaminectomy with Kerrisons until we were able to identify the takeoff of the L3 nerve root.  Dissection was then carried laterally finishing the facetectomy with Kerrisons along the track of the L3 nerve root.  The L3 nerve root itself was being compressed by the facet arthropathy but also from a large disc herniation at the L3-4 disc space.  This disc herniation was displacing the nerve root superiorly contributing to stenosis along the inferior edge of the L3 pedicle.  It was also causing rather severe stenosis at the leading edge of the L4 lamina.  We also noted a large free fragment superior to the disc space in the axilla of the L3 nerve root. In order to adequately decompress the L3 nerve root, I used a 15 blade scapel to create an annulotomy in the disc. Backdown curettes and pituitaries were then used to retrieve disc material.  The decompression of the exiting L3 nerve root was much more involved than what is usually required for a transforaminal lumbar interbody fusion by itself due to the severe facet arthropathy and the large disc herniation below.     After completing the decompression of L3, I retracted the L4 nerve root and the central dural sac medially exposing more of the L3-4 disc space.  Once again, I used the scalpel to extend the annulotomy medially allowing  more access to the remaining disc space.   A series of disc space distractors were placed into the disc space which allowed preparation of the endplates and removal of all the disc material.  This was accomplished using backdown curettes, endplate scrapers, pituitaries, and Kerrisons.  The disc space distractors were then sequentially increased up to an 11 mm distractor and this was felt to be the best size for a PEEK spacer for restoring disc height and assisting with the fusion.  The disc space was then thoroughly irrigated with saline solution.     The disc space was then packed with a combination of locally obtained autograft bone and allograft bone matrix mixed with an allograft liquid called BioBurst.  More of this bone was then packed into an 11 mm spacer from the [employer] PEEK spacer set.  The spacer itself measured 11 mm x 26 mm.  Once this spacer was properly packed as tightly as possible, it was malleted into the L3-4 disc space under fluoroscopic guidance.  It was placed as a PEEK interbody biomechanical device to assist with fusion.   More locally obtained autograft bone was then packed into the L3-4 disc space to augment the fusion as well.  Bleeding in the epidural space was controlled using thrombin with Gelfoam powder and bipolar cautery.  The entire area was then thoroughly irrigated with saline solution.     At this point, I used a high-speed bur to decorticate the transverse process of L3 and L4.  The lateral gutter was then filled with the remaining bone allograft matrix and locally obtained autograft bone.  This constituted a posterior lateral fusion of L3-4.     Under fluoroscopic guidance, I then created starting holes for the placement of pedicle screws.  A pedicle probe was used to gain access through the starting hole into the anterior vertebral body.  Each pedicle tract was palpated with a ball-tipped feeler to ensure that there was no medial or lateral breach.  I placed a 6.5 mm x  55 mm screw into each pedicle at L3 and L4.  After confirming the screws were properly positioned under fluoroscopic guidance, an appropriate-sized jessie was chosen to span the pedicle screws.  This was held into position using set screws and tightened using the appropriate antitorque wrench and torque limiting screwdriver provided by [facility].     The wound was then copiously irrigated with saline solution.  Fluoroscopy was used to confirm the screws and interbody spacer were properly positioned.  There was excellent restoration of disc height compared to preoperative films.       The incision was then irrigated a final time, and a thorough inspection was done to ensure that we had adequate hemostasis.  Again bleeding was controlled using thrombin with Gelfoam powder and bipolar cautery.  Closure was then accomplished by reapproximating the fascia with a #1 Vicryl.  Immediate subcutaneous tissues were closed with a 2-0 Vicryl.  Skin closure was then augmented using Mastisol and Steri-Strips.  The wound was then sterilely dressed with Bioclusive dressings.  The patient was carefully rotated supine onto a hospital gurney, extubated, and sent to the recovery room in good stable condition. The patient tolerated the procedure well, there were no complications.  We estimated blood loss to be approximately 50 mL during the procedure, and the patient remained hemodynamically stable.     Imtiaz Pinon PA-C provided critical assistance during the procedure.  His assistance was medically necessary in order to allow the procedure to occur in the most safe and efficient manner.  He assisted not only with patient positioning and wound closure, but more importantly with retraction of delicate neurovascular structures during the decompression of L3-4.  He also assisted during the placement of the bone graft, interbody spacer and instrumentation to obtain a fusion across L3-4.    LIZANDRO Cloud MD     Date: 8/7/2020  Time: 15:48

## 2020-08-08 PROBLEM — N18.30 CKD (CHRONIC KIDNEY DISEASE) STAGE 3, GFR 30-59 ML/MIN (HCC): Status: ACTIVE | Noted: 2020-08-08

## 2020-08-08 PROBLEM — I48.20 CHRONIC ATRIAL FIBRILLATION (HCC): Status: ACTIVE | Noted: 2019-02-02

## 2020-08-08 PROBLEM — G47.33 OSA (OBSTRUCTIVE SLEEP APNEA): Chronic | Status: ACTIVE | Noted: 2017-03-07

## 2020-08-08 PROBLEM — I51.9 LV DYSFUNCTION: Chronic | Status: ACTIVE | Noted: 2017-11-25

## 2020-08-08 PROBLEM — E78.5 HYPERLIPIDEMIA: Chronic | Status: ACTIVE | Noted: 2020-08-08

## 2020-08-08 PROBLEM — I10 ESSENTIAL HYPERTENSION: Status: ACTIVE | Noted: 2020-08-08

## 2020-08-08 PROBLEM — I51.9 LV DYSFUNCTION: Status: ACTIVE | Noted: 2017-11-25

## 2020-08-08 PROBLEM — Z95.2 S/P AVR (AORTIC VALVE REPLACEMENT): Status: ACTIVE | Noted: 2017-11-25

## 2020-08-08 PROBLEM — I48.20 CHRONIC ATRIAL FIBRILLATION (HCC): Chronic | Status: ACTIVE | Noted: 2019-02-02

## 2020-08-08 PROBLEM — I42.8 NONISCHEMIC CARDIOMYOPATHY (HCC): Status: ACTIVE | Noted: 2019-02-02

## 2020-08-08 PROBLEM — E11.9 TYPE 2 DIABETES MELLITUS (HCC): Chronic | Status: ACTIVE | Noted: 2020-08-08

## 2020-08-08 PROBLEM — Z79.01 LONG TERM CURRENT USE OF ANTICOAGULANT THERAPY: Chronic | Status: ACTIVE | Noted: 2017-11-25

## 2020-08-08 PROBLEM — Z95.2 S/P AVR (AORTIC VALVE REPLACEMENT): Chronic | Status: ACTIVE | Noted: 2017-11-25

## 2020-08-08 PROBLEM — I10 ESSENTIAL HYPERTENSION: Chronic | Status: ACTIVE | Noted: 2020-08-08

## 2020-08-08 PROBLEM — I42.8 NONISCHEMIC CARDIOMYOPATHY (HCC): Chronic | Status: ACTIVE | Noted: 2019-02-02

## 2020-08-08 PROBLEM — E03.9 HYPOTHYROIDISM (ACQUIRED): Chronic | Status: ACTIVE | Noted: 2020-08-08

## 2020-08-08 PROBLEM — G47.33 OSA (OBSTRUCTIVE SLEEP APNEA): Status: ACTIVE | Noted: 2017-03-07

## 2020-08-08 PROBLEM — Z79.01 LONG TERM CURRENT USE OF ANTICOAGULANT THERAPY: Status: ACTIVE | Noted: 2017-11-25

## 2020-08-08 LAB
ANION GAP SERPL CALCULATED.3IONS-SCNC: 12 MMOL/L (ref 5–15)
BASOPHILS # BLD AUTO: 0.08 10*3/MM3 (ref 0–0.2)
BASOPHILS NFR BLD AUTO: 0.6 % (ref 0–1.5)
BUN SERPL-MCNC: 19 MG/DL (ref 8–23)
BUN/CREAT SERPL: 11.5 (ref 7–25)
CALCIUM SPEC-SCNC: 8.8 MG/DL (ref 8.6–10.5)
CHLORIDE SERPL-SCNC: 98 MMOL/L (ref 98–107)
CO2 SERPL-SCNC: 31 MMOL/L (ref 22–29)
CREAT SERPL-MCNC: 1.65 MG/DL (ref 0.76–1.27)
DEPRECATED RDW RBC AUTO: 43.7 FL (ref 37–54)
EOSINOPHIL # BLD AUTO: 0.24 10*3/MM3 (ref 0–0.4)
EOSINOPHIL NFR BLD AUTO: 1.7 % (ref 0.3–6.2)
ERYTHROCYTE [DISTWIDTH] IN BLOOD BY AUTOMATED COUNT: 12.7 % (ref 12.3–15.4)
GFR SERPL CREATININE-BSD FRML MDRD: 41 ML/MIN/1.73
GLUCOSE BLDC GLUCOMTR-MCNC: 118 MG/DL (ref 70–130)
GLUCOSE BLDC GLUCOMTR-MCNC: 163 MG/DL (ref 70–130)
GLUCOSE SERPL-MCNC: 156 MG/DL (ref 65–99)
HCT VFR BLD AUTO: 36.2 % (ref 37.5–51)
HGB BLD-MCNC: 11.6 G/DL (ref 13–17.7)
IMM GRANULOCYTES # BLD AUTO: 0.06 10*3/MM3 (ref 0–0.05)
IMM GRANULOCYTES NFR BLD AUTO: 0.4 % (ref 0–0.5)
INR PPP: 1.32 (ref 0.91–1.09)
LYMPHOCYTES # BLD AUTO: 2.03 10*3/MM3 (ref 0.7–3.1)
LYMPHOCYTES NFR BLD AUTO: 14.7 % (ref 19.6–45.3)
MCH RBC QN AUTO: 30.4 PG (ref 26.6–33)
MCHC RBC AUTO-ENTMCNC: 32 G/DL (ref 31.5–35.7)
MCV RBC AUTO: 95 FL (ref 79–97)
MONOCYTES # BLD AUTO: 1.27 10*3/MM3 (ref 0.1–0.9)
MONOCYTES NFR BLD AUTO: 9.2 % (ref 5–12)
NEUTROPHILS NFR BLD AUTO: 10.16 10*3/MM3 (ref 1.7–7)
NEUTROPHILS NFR BLD AUTO: 73.4 % (ref 42.7–76)
NRBC BLD AUTO-RTO: 0 /100 WBC (ref 0–0.2)
PLATELET # BLD AUTO: 235 10*3/MM3 (ref 140–450)
PMV BLD AUTO: 10.7 FL (ref 6–12)
POTASSIUM SERPL-SCNC: 4.6 MMOL/L (ref 3.5–5.2)
PROTHROMBIN TIME: 16 SECONDS (ref 11.9–14.6)
RBC # BLD AUTO: 3.81 10*6/MM3 (ref 4.14–5.8)
SODIUM SERPL-SCNC: 141 MMOL/L (ref 136–145)
WBC # BLD AUTO: 13.84 10*3/MM3 (ref 3.4–10.8)

## 2020-08-08 PROCEDURE — 82962 GLUCOSE BLOOD TEST: CPT

## 2020-08-08 PROCEDURE — 25010000003 HYDROMORPHONE 1 MG/ML SOLUTION: Performed by: ORTHOPAEDIC SURGERY

## 2020-08-08 PROCEDURE — 97162 PT EVAL MOD COMPLEX 30 MIN: CPT

## 2020-08-08 PROCEDURE — 25010000002 CEFAZOLIN PER 500 MG: Performed by: PHYSICIAN ASSISTANT

## 2020-08-08 PROCEDURE — 80048 BASIC METABOLIC PNL TOTAL CA: CPT | Performed by: ORTHOPAEDIC SURGERY

## 2020-08-08 PROCEDURE — 97166 OT EVAL MOD COMPLEX 45 MIN: CPT | Performed by: OCCUPATIONAL THERAPIST

## 2020-08-08 PROCEDURE — 85025 COMPLETE CBC W/AUTO DIFF WBC: CPT | Performed by: ORTHOPAEDIC SURGERY

## 2020-08-08 PROCEDURE — 85610 PROTHROMBIN TIME: CPT | Performed by: FAMILY MEDICINE

## 2020-08-08 PROCEDURE — 94799 UNLISTED PULMONARY SVC/PX: CPT

## 2020-08-08 RX ORDER — DEXTROSE MONOHYDRATE 25 G/50ML
25 INJECTION, SOLUTION INTRAVENOUS
Status: DISCONTINUED | OUTPATIENT
Start: 2020-08-08 | End: 2020-08-09 | Stop reason: HOSPADM

## 2020-08-08 RX ORDER — WARFARIN SODIUM 5 MG/1
5 TABLET ORAL
Status: DISCONTINUED | OUTPATIENT
Start: 2020-08-08 | End: 2020-08-09 | Stop reason: HOSPADM

## 2020-08-08 RX ORDER — OXYCODONE AND ACETAMINOPHEN 7.5; 325 MG/1; MG/1
1 TABLET ORAL EVERY 4 HOURS PRN
Start: 2020-08-08 | End: 2020-08-13 | Stop reason: HOSPADM

## 2020-08-08 RX ORDER — NICOTINE POLACRILEX 4 MG
15 LOZENGE BUCCAL
Status: DISCONTINUED | OUTPATIENT
Start: 2020-08-08 | End: 2020-08-09 | Stop reason: HOSPADM

## 2020-08-08 RX ADMIN — POTASSIUM CHLORIDE 20 MEQ: 1.5 POWDER, FOR SOLUTION ORAL at 08:55

## 2020-08-08 RX ADMIN — METOPROLOL SUCCINATE 100 MG: 100 TABLET, EXTENDED RELEASE ORAL at 21:03

## 2020-08-08 RX ADMIN — OXYCODONE HYDROCHLORIDE AND ACETAMINOPHEN 2 TABLET: 7.5; 325 TABLET ORAL at 06:09

## 2020-08-08 RX ADMIN — CEFAZOLIN SODIUM 2 G: 10 INJECTION, POWDER, FOR SOLUTION INTRAVENOUS at 04:26

## 2020-08-08 RX ADMIN — METOPROLOL SUCCINATE 100 MG: 100 TABLET, EXTENDED RELEASE ORAL at 08:55

## 2020-08-08 RX ADMIN — DOCUSATE SODIUM 50 MG AND SENNOSIDES 8.6 MG 1 TABLET: 8.6; 5 TABLET, FILM COATED ORAL at 08:55

## 2020-08-08 RX ADMIN — METFORMIN HYDROCHLORIDE 1000 MG: 500 TABLET ORAL at 17:10

## 2020-08-08 RX ADMIN — GUAIFENESIN 1200 MG: 600 TABLET, EXTENDED RELEASE ORAL at 08:55

## 2020-08-08 RX ADMIN — OXYCODONE HYDROCHLORIDE AND ACETAMINOPHEN 1000 MG: 500 TABLET ORAL at 08:55

## 2020-08-08 RX ADMIN — SODIUM CHLORIDE, PRESERVATIVE FREE 3 ML: 5 INJECTION INTRAVENOUS at 21:03

## 2020-08-08 RX ADMIN — FUROSEMIDE 40 MG: 40 TABLET ORAL at 08:55

## 2020-08-08 RX ADMIN — ATORVASTATIN CALCIUM 10 MG: 10 TABLET, FILM COATED ORAL at 21:03

## 2020-08-08 RX ADMIN — DIAZEPAM 5 MG: 5 TABLET ORAL at 17:10

## 2020-08-08 RX ADMIN — GLIPIZIDE 5 MG: 5 TABLET ORAL at 08:55

## 2020-08-08 RX ADMIN — MONTELUKAST 10 MG: 10 TABLET ORAL at 21:03

## 2020-08-08 RX ADMIN — METFORMIN HYDROCHLORIDE 1000 MG: 500 TABLET ORAL at 08:55

## 2020-08-08 RX ADMIN — LEVOTHYROXINE SODIUM 175 MCG: 150 TABLET ORAL at 06:09

## 2020-08-08 RX ADMIN — OXYCODONE HYDROCHLORIDE AND ACETAMINOPHEN 2 TABLET: 7.5; 325 TABLET ORAL at 14:01

## 2020-08-08 RX ADMIN — WARFARIN SODIUM 5 MG: 5 TABLET ORAL at 17:10

## 2020-08-08 RX ADMIN — HYDROMORPHONE HYDROCHLORIDE 1 MG: 1 INJECTION, SOLUTION INTRAMUSCULAR; INTRAVENOUS; SUBCUTANEOUS at 02:28

## 2020-08-08 RX ADMIN — FUROSEMIDE 40 MG: 40 TABLET ORAL at 17:10

## 2020-08-08 RX ADMIN — DOCUSATE SODIUM 50 MG AND SENNOSIDES 8.6 MG 1 TABLET: 8.6; 5 TABLET, FILM COATED ORAL at 21:03

## 2020-08-08 NOTE — NURSING NOTE
Bedside neuro with FIONA Chi and FIONA Delarosa. No neuro changes. Dressing CDI. Hemovac in place.

## 2020-08-08 NOTE — PLAN OF CARE
Pt AAO x4. Medicated prn for c/o pain. Up x2 with LSO brace. Voiding. Dressing to back changed to gauze/tegaderm, Hemovac removed. VSS. Safety maintained. Will continue to monitor.

## 2020-08-08 NOTE — PLAN OF CARE
Problem: Patient Care Overview  Goal: Plan of Care Review  Outcome: Ongoing (interventions implemented as appropriate)  Flowsheets  Taken 8/8/2020 1651 by Arin Ramon RN  Progress: improving  Taken 8/8/2020 1536 by Zoila Andrea PT  Plan of Care Reviewed With: patient;spouse  Outcome Summary: PT eval completed.  Pt motivated and eager to improve.  Sitting on side of bed w/ brace donned upon therapist arrival to room.  Performed bed mobility, tfers and took steps at bedside w/ assist of 1-2.  Pt w/ noted R knee buckling w/ wt bearing requiring increase assistance.  Pt will benefit from continued PT services to improve knowledge of spinal precautions, brace mgmt, to improve overall strength, balance and activity tolerance assisting w/ pain mgmt and to improve I w/ functional mobility using AD as appropriate improving safety awareness and reducing fall risk.  Recommend home w/ assist and possible home health at discharge if pt progresses as expected, however if pt continues to require increase assist w/ mobility, pt may need inpatient rehab in acute rehab or SNF.  Will follow for progress.

## 2020-08-08 NOTE — PLAN OF CARE
Problem: Patient Care Overview  Goal: Plan of Care Review  Outcome: Ongoing (interventions implemented as appropriate)  Flowsheets  Taken 8/8/2020 0534  Progress: no change  Outcome Summary: Pt. A&Ox4. No n/v changes. N/T present in toes, pt baseline. PRN IV pain medication given w/ good relief. Voiding. Drsg CDI. IV ABX given. SCDs in place. , 3L O2. VSS. Safety Maintained.  Taken 8/7/2020 2017  Plan of Care Reviewed With: patient

## 2020-08-08 NOTE — DISCHARGE SUMMARY
Date of Discharge:  8/8/2020    Admission Diagnosis: M51.26    Discharge Diagnosis:   1. Back pain.  2. Severe right anterior thigh and leg radiculopathy.  3. Multilevel lumbar degenerative disk disease.  4. Multilevel lumbar facet arthropathy.  5. Large foraminal disk herniation , right L3-4.  6. Severe central and right-sided foraminal stenosis, L3-4.  7. Atrial fibrillation, on Coumadin.  8. History of bladder cancer.  9.  Status post right L3-4 hemilaminectomy decompression with complete facetectomy, neuroforaminotomy, discectomy,  TLIF L3-4, PSF with instrumentation L3-4, 8/7/2020    Consults During Admission: None    Hospital Course  Patient is a 75 y.o. male Known to our practice. Admitted for the above lumbar fusion.  This has been well tolerated and the patient will be discharged home today in good stable condition if he does well with physical therapy with instructions for brace when out of bed.  No driving until directed.  Patient will follow-up with Dr. Cloud's clinic in two weeks. They will call if problems arise.         Condition on Discharge:  STABLE    Vital Signs  Temp:  [97.6 °F (36.4 °C)-98.9 °F (37.2 °C)] 98.7 °F (37.1 °C)  Heart Rate:  [76-97] 76  Resp:  [14-20] 16  BP: (116-168)/() 116/57  Arterial Line BP: (137-155)/(64-74) 137/64    Physical Exam:   Alert and oriented ×3, no acute distress, grossly neurovascularly intact, vital signs stable, dressing clean dry and intact, moving all extremities without focal deficit      Discharge Disposition  Home or Self Care    Discharge Medications     Discharge Medications      New Medications      Instructions Start Date   oxyCODONE-acetaminophen 7.5-325 MG per tablet  Commonly known as:  PERCOCET  Replaces:  oxyCODONE-acetaminophen 5-325 MG per tablet   1 tablet, Oral, Every 4 Hours PRN         Continue These Medications      Instructions Start Date   aspirin 81 MG chewable tablet   81 mg, Oral, Daily      atorvastatin 10 MG tablet  Commonly  known as:  LIPITOR   10 mg, Oral, Daily      fish oil 1200 MG capsule capsule   1,200 mg, Oral, Daily      furosemide 40 MG tablet  Commonly known as:  LASIX   40 mg, Oral, 2 Times Daily      glipizide 5 MG tablet  Commonly known as:  GLUCOTROL   5 mg, Oral, Daily      levothyroxine 175 MCG tablet  Commonly known as:  SYNTHROID, LEVOTHROID   175 mcg, Oral, Daily      metFORMIN 1000 MG tablet  Commonly known as:  GLUCOPHAGE   1,000 mg, Oral, 2 Times Daily With Meals      metoprolol succinate  MG 24 hr tablet  Commonly known as:  TOPROL-XL   100 mg, Oral, 2 times daily      montelukast 10 MG tablet  Commonly known as:  SINGULAIR   10 mg, Oral, Nightly      Mucinex 600 MG 12 hr tablet  Generic drug:  guaiFENesin   1,200 mg, Oral, Daily      potassium chloride 20 MEQ packet  Commonly known as:  KLOR-CON   20 mEq, Oral, Daily      vitamin C 500 MG tablet  Commonly known as:  ASCORBIC ACID   500 mg, Oral, Daily      vitamin D 1.25 MG (83571 UT) capsule capsule  Commonly known as:  ERGOCALCIFEROL   50,000 Units, Oral, Weekly      warfarin 5 MG tablet  Commonly known as:  COUMADIN   5 mg, Oral, Daily Warfarin         Stop These Medications    oxyCODONE-acetaminophen 5-325 MG per tablet  Commonly known as:  PERCOCET  Replaced by:  oxyCODONE-acetaminophen 7.5-325 MG per tablet            Discharge Diet: Resume Home diet, advance as tolerated    Activity at Discharge: Resume home activity advace as tolerated, no lifting, no twisting, no bending, brace as directed, no driving until directed.     Follow-up Appointments  Followup with PCP within one week  Followup [facility] at 2weeks post-op         Shamar Hernandez PA-C  08/08/20  13:45

## 2020-08-08 NOTE — CONSULTS
Family Medicine Consult Note      Referring Provider: Dr. Cloud  Reason for Consultation: Postoperative medical management of diabetes and other chronic medical conditions    Patient Care Team:  Pilo Costa MD as PCP - General (Family Medicine)    Chief complaint back pain with radiculopathy    Subjective .     History of present illness: Mr. Solorio is a 75-year-old admitted by orthopedic spine surgery service following lumbar decompression. The patient presents for surgical correction after failing conservative management.  They have been through anti-inflammatories, muscle relaxers, and pain medication.  The pain has progressed to the point in time where it is affecting their activities of daily living and after being explained all their options, elected to undergo surgical correction.  Their primary care physician does not attend here at [facility]; therefore, I have been asked to take care of their primary medical needs in the perioperative period.  Postoperative pain is as expected.  There are no other precipitating or relieving factors. I have been requested by the Attending Physician to provide Medical Consultation in the perioperative period. The patient understands my role in their hospitalization and agrees with my treatment plan. They understand the importance of follow up with their PCP upon discharge  from [facility] for any concerns or abnormalities.    Review of Systems  Pertinent items are noted in HPI, all other systems reviewed and negative    History  Past Medical History:   Diagnosis Date   • Arthritis    • Atrial fibrillation (CMS/HCC)    • Back pain    • Cancer (CMS/HCC)     bladder   • DDD (degenerative disc disease), lumbar 8/7/2020   • Diabetes mellitus (CMS/HCC)    • Disease of thyroid gland    • Function kidney decreased    • Heart valve replaced    • Hyperlipidemia    • Hypertension    • Lumbar back pain 8/7/2020   • Lumbar radiculopathy 8/7/2020   • Neuropathy       • Sleep apnea    ,   Past Surgical History:   Procedure Laterality Date   • CARDIAC VALVE REPLACEMENT     • CYSTOSCOPY BLADDER BIOPSY     • VASECTOMY     , History reviewed. No pertinent family history.,   Social History     Tobacco Use   • Smoking status: Former Smoker   • Smokeless tobacco: Never Used   • Tobacco comment: quit 40 years ago   Substance Use Topics   • Alcohol use: Never     Frequency: Never   • Drug use: Never    and   Medications Prior to Admission   Medication Sig Dispense Refill Last Dose   • aspirin 81 MG chewable tablet Chew 81 mg Daily.   8/7/2020 at Unknown time   • atorvastatin (LIPITOR) 10 MG tablet Take 10 mg by mouth Daily.   8/7/2020 at 2000   • furosemide (LASIX) 40 MG tablet Take 40 mg by mouth 2 (Two) Times a Day.   8/7/2020 at 2000   • glipizide (GLUCOTROL) 5 MG tablet Take 5 mg by mouth Daily.   8/7/2020 at 0800   • guaiFENesin (Mucinex) 600 MG 12 hr tablet Take 1,200 mg by mouth Daily.   8/7/2020 at Unknown time   • levothyroxine (SYNTHROID, LEVOTHROID) 175 MCG tablet Take 175 mcg by mouth Daily.   8/7/2020 at 1000   • metFORMIN (GLUCOPHAGE) 1000 MG tablet Take 1,000 mg by mouth 2 (Two) Times a Day With Meals.   8/7/2020 at 2000   • metoprolol succinate XL (TOPROL-XL) 100 MG 24 hr tablet Take 100 mg by mouth 2 (two) times a day.   8/7/2020 at 0645   • montelukast (SINGULAIR) 10 MG tablet Take 10 mg by mouth Every Night.   8/7/2020 at 2000   • Omega-3 Fatty Acids (FISH OIL) 1200 MG capsule capsule Take 1,200 mg by mouth Daily.   8/7/2020 at 2000   • oxyCODONE-acetaminophen (PERCOCET) 5-325 MG per tablet Take 1 tablet by mouth Every 6 (Six) Hours As Needed.   8/7/2020 at 0645   • vitamin C (ASCORBIC ACID) 500 MG tablet Take 500 mg by mouth Daily.   8/7/2020 at 0800   • vitamin D (ERGOCALCIFEROL) 1.25 MG (16088 UT) capsule capsule Take 50,000 Units by mouth 1 (One) Time Per Week.   8/7/2020 at Unknown time   • warfarin (COUMADIN) 5 MG tablet Take 5 mg by mouth Daily.   Past Week at  Unknown time   • potassium chloride (KLOR-CON) 20 MEQ packet Take 20 mEq by mouth Daily.          Objective     Vital Signs   Temp:  [97.6 °F (36.4 °C)-98.9 °F (37.2 °C)] 98.9 °F (37.2 °C)  Heart Rate:  [76-97] 84  Resp:  [14-20] 18  BP: (122-168)/() 128/77  Arterial Line BP: (137-155)/(64-74) 137/64    Physical Exam:   General Appearance alert, appears stated age and cooperative  Head normocephalic, without obvious abnormality and atraumatic  Eyes lids and lashes normal, conjunctivae and sclerae normal and no icterus  Neck supple, trachea midline, no thyromegaly and no carotid bruit  Lungs respirations regular, respirations even and respirations unlabored  Heart normal S1, S2 and no murmur, no gallop, no rub, irregular with normal rate  Abdomen normal bowel sounds, soft non-tender and no guarding  Extremities no edema, no cyanosis and no redness, edema trace lower bilateral  Skin turgor normal and color normal  Neurologic Mental Status orientated to person, place, time and situation    Results Review:   I reviewed the patient's new clinical results.    Lab Results (last 24 hours)     Procedure Component Value Units Date/Time    Basic Metabolic Panel [[id number]]  (Abnormal) Collected:  08/08/20 0320    Specimen:  Blood Updated:  08/08/20 0418     Glucose 156 mg/dL      BUN 19 mg/dL      Creatinine 1.65 mg/dL      Sodium 141 mmol/L      Potassium 4.6 mmol/L      Chloride 98 mmol/L      CO2 31.0 mmol/L      Calcium 8.8 mg/dL      eGFR Non African Amer 41 mL/min/1.73      BUN/Creatinine Ratio 11.5     Anion Gap 12.0 mmol/L     Narrative:       GFR Normal >60  Chronic Kidney Disease <60  Kidney Failure <15      CBC & Differential [[id number]] Collected:  08/08/20 0320    Specimen:  Blood Updated:  08/08/20 0356    Narrative:       The following orders were created for panel order CBC & Differential.  Procedure                               Abnormality         Status                     ---------                                -----------         ------                     CBC Auto Differential[[id number]]        Abnormal            Final result                 Please view results for these tests on the individual orders.    CBC Auto Differential [[id number]]  (Abnormal) Collected:  08/08/20 0320    Specimen:  Blood Updated:  08/08/20 0356     WBC 13.84 10*3/mm3      RBC 3.81 10*6/mm3      Hemoglobin 11.6 g/dL      Hematocrit 36.2 %      MCV 95.0 fL      MCH 30.4 pg      MCHC 32.0 g/dL      RDW 12.7 %      RDW-SD 43.7 fl      MPV 10.7 fL      Platelets 235 10*3/mm3      Neutrophil % 73.4 %      Lymphocyte % 14.7 %      Monocyte % 9.2 %      Eosinophil % 1.7 %      Basophil % 0.6 %      Immature Grans % 0.4 %      Neutrophils, Absolute 10.16 10*3/mm3      Lymphocytes, Absolute 2.03 10*3/mm3      Monocytes, Absolute 1.27 10*3/mm3      Eosinophils, Absolute 0.24 10*3/mm3      Basophils, Absolute 0.08 10*3/mm3      Immature Grans, Absolute 0.06 10*3/mm3      nRBC 0.0 /100 WBC     POC Glucose Once [[id number]]  (Abnormal) Collected:  08/07/20 1533    Specimen:  Blood Updated:  08/07/20 1551     Glucose 135 mg/dL      Comment: : [id number] Justin ArcherMeter ID: [id number]             Imaging Results (Last 72 Hours)     Procedure Component Value Units Date/Time    XR Spine Lumbar AP & Lateral [[id number]] Collected:  08/07/20 1507     Updated:  08/07/20 1511    Narrative:       HISTORY: Lumbar fusion     Lumbar spine: 4 spot views lumbar spine obtained intraoperatively.     Fluoroscopy time 0.31 min/dose 16.35 mGy     Images demonstrate unilateral posterior fusion likely at L3/L4 with  interposition graft in place. Please refer to operative report for  further details.  This report was finalized on 08/07/2020 15:08 by Dr. Evette Carson MD.    FL C Arm During Surgery [[id number]] Resulted:  08/07/20 1224     Updated:  08/07/20 1502          Assessment/Plan       Lumbar radiculopathy    DDD (degenerative disc disease), lumbar    Lumbar  back pain    Chronic atrial fibrillation (CMS/[facility])    Long term current use of anticoagulant therapy    Nonischemic cardiomyopathy (CMS/[facility])    KEYONA (obstructive sleep apnea)    S/P AVR (aortic valve replacement)    LV dysfunction    Type 2 diabetes mellitus (CMS/[facility])    Essential hypertension    Hyperlipidemia    CKD (chronic kidney disease) stage 3, GFR 30-59 ml/min (CMS/[facility])      Is status post decompressive surgery.  May need to restart his anticoagulation due to his atrial fibrillation which he takes chronically.  I have ordered daily INR and to resume patient's anticoagulation given that he states this is a 1 stage surgical procedure and I cannot find anything differing from that in his preadmission notes.  As for his diabetes, I agree with continuation of his home medications.  I have written for serial Accu-Cheks to be performed and supplemental insulin as needed.  Also hypoglycemia orders given he is on a sulfonylurea which makes his risk of hypoglycemia higher.  Hemodynamically he is currently stable on home medications.  Continue thyroid medication.  It also appears he has some chronic kidney disease not mentioned in his history.  His creatinine and estimated GFR are relatively stable.  Follow this during his stay.      I discussed the patients findings and my recommendations with patient and family    Luis Pfeiffer MD  08/08/20  11:01    Time: Greater than 50 minutes in total spent both in direct patient care and in review of his chart.

## 2020-08-08 NOTE — PLAN OF CARE
Problem: Patient Care Overview  Goal: Plan of Care Review  Flowsheets  Taken 8/7/2020 1951  Progress: no change  Outcome Summary: A&Ox4. JADON. C/o pain. PRN pain medication given. Dressing CDI. Hemovac in place. SCDS for VTE prevention. Continue to monitor. DTV. sAfety maintained.  Taken 8/7/2020 1702  Plan of Care Reviewed With: patient     Problem: Patient Care Overview  Goal: Individualization and Mutuality  Flowsheets (Taken 8/7/2020 1951)  Patient Specific Goals (Include Timeframe): Manage pain before d/c  Patient Specific Interventions: LSO when OOB

## 2020-08-08 NOTE — THERAPY EVALUATION
Acute Care - Occupational Therapy Initial Evaluation  [facility]     Patient Name: Shabbir Solorio  : 1945  MRN: [id number]  Today's Date: 2020  Onset of Illness/Injury or Date of Surgery: 20  Date of Referral to OT: 20  Referring Physician: Dr. Cloud    Admit Date: 2020       ICD-10-CM ICD-9-CM   1. Lumbar back pain M54.5 724.2   2. Gait abnormality R26.9 781.2   3. Impaired mobility and ADLs Z74.09 V49.89    Z78.9      Patient Active Problem List   Diagnosis   • Lumbar radiculopathy   • DDD (degenerative disc disease), lumbar   • Lumbar back pain   • Chronic atrial fibrillation (CMS/HCC)   • Long term current use of anticoagulant therapy   • Nonischemic cardiomyopathy (CMS/HCC)   • KEYONA (obstructive sleep apnea)   • S/P AVR (aortic valve replacement)   • LV dysfunction   • Type 2 diabetes mellitus (CMS/HCC)   • Essential hypertension   • Hyperlipidemia   • CKD (chronic kidney disease) stage 3, GFR 30-59 ml/min (CMS/HCC)   • Hypothyroidism (acquired)     Past Medical History:   Diagnosis Date   • Arthritis    • Atrial fibrillation (CMS/HCC)    • Back pain    • Cancer (CMS/HCC)     bladder   • DDD (degenerative disc disease), lumbar 2020   • Diabetes mellitus (CMS/HCC)    • Disease of thyroid gland    • Function kidney decreased    • Heart valve replaced    • Hyperlipidemia    • Hypertension    • Lumbar back pain 2020   • Lumbar radiculopathy 2020   • Neuropathy    • Sleep apnea      Past Surgical History:   Procedure Laterality Date   • CARDIAC VALVE REPLACEMENT     • CYSTOSCOPY BLADDER BIOPSY     • VASECTOMY            OT ASSESSMENT FLOWSHEET (last 12 hours)      Occupational Therapy Evaluation     Row Name 20 1537                   OT Evaluation Time/Intention    Subjective Information  complains of;pain;numbness n/t B feet  -MM        Document Type  evaluation;other (see comments) see MAR  -MM        Mode of Treatment  occupational therapy  -MM           General  "Information    Patient Profile Reviewed?  yes  -MM        Onset of Illness/Injury or Date of Surgery  08/07/20  -MM        Referring Physician  Dr. Cloud  -MM        Patient Observations  alert;cooperative;agree to therapy  -MM        Patient/Family Observations  spouse present  -MM        General Observations of Patient  sitting EOB, brace donned, DOMINICK drain, no apparent distress  -MM        Prior Level of Function  independent:;all household mobility;community mobility;ADL's;home management;driving;shopping;using stairs  -MM        Equipment Currently Used at Home  cane, straight;walker, rolling;shoe horn built in shower chairs, long handled \"brush\"  -MM        Pertinent History of Current Functional Problem  s/p R L3-4 hemilaminectomy, facetectomy, discectomy and transforaminal lumbar interbody fusion, posterior spinal fusion w/ instrumentation  -MM        Existing Precautions/Restrictions  fall;spinal;brace worn when out of bed  -MM        Limitations/Impairments  safety/cognitive  -MM        Equipment Issued to Patient  gait belt;walker, front wheeled  -MM        Risks Reviewed  patient and family:;LOB;nausea/vomiting;dizziness;increased discomfort;increased drainage;change in vital signs;lines disloged  -MM        Benefits Reviewed  patient and family:;increase independence;improve function;increase strength;decrease pain;increase balance;decrease risk of DVT;improve skin integrity;increase knowledge  -MM        Barriers to Rehab  physical barrier  -MM           Relationship/Environment    Lives With  spouse  -MM           Resource/Environmental Concerns    Current Living Arrangements  home/apartment/condo  -MM           Home Main Entrance    Number of Stairs, Main Entrance  two  -MM        Stair Railings, Main Entrance  none  -MM           Cognitive Assessment/Interventions    Additional Documentation  Cognitive Assessment/Intervention (Group)  -MM           Cognitive Assessment/Intervention- PT/OT    " Affect/Mental Status (Cognitive)  WNL  -MM        Orientation Status (Cognition)  oriented x 4  -MM        Follows Commands (Cognition)  WNL  -MM        Safety Deficit (Cognitive)  mild deficit;at risk behavior observed;insight into deficits/self awareness;safety precautions awareness  -MM        Personal Safety Interventions  fall prevention program maintained;gait belt;muscle strengthening facilitated;nonskid shoes/slippers when out of bed;supervised activity  -MM           Safety Issues, Functional Mobility    Safety Issues Affecting Function (Mobility)  safety precaution awareness;awareness of need for assistance;insight into deficits/self awareness  -MM        Impairments Affecting Function (Mobility)  balance;endurance/activity tolerance;pain;strength  -MM           Bed Mobility Assessment/Treatment    Bed Mobility Assessment/Treatment  rolling right;scooting/bridging;sidelying-sit;sit-sidelying  -MM        Rolling Right [place] (Bed Mobility)  minimum assist (75% patient effort);contact guard;1 person assist;2 person assist;verbal cues  -MM        Scooting/Bridging [place] (Bed Mobility)  minimum assist (75% patient effort);2 person assist;verbal cues  -MM        Sidelying-Sit [place] (Bed Mobility)  minimum assist (75% patient effort);contact guard;verbal cues  -MM        Sit-Sidelying [place] (Bed Mobility)  minimum assist (75% patient effort);contact guard;verbal cues  -MM        Assistive Device (Bed Mobility)  bed rails;head of bed elevated;draw sheet  -MM        Comment (Bed Mobility)  pt sitting up on edge of bed w/ brace donned when therapist entered room; removed brace and performed back to bed tfer, logrolling and tfer back to sitting w/ increase time and effort; LSO brace donned again before out of bed activity performed  -MM           Functional Mobility    Functional Mobility- Ind. Level  minimum assist (75% patient effort);contact guard assist;verbal cues required;2 person  assist required  -MM        Functional Mobility- Device  rolling walker  -MM        Functional Mobility- Comment  Attempted to take steps away from bed, RLE tala return to sitting EOB. Attempt again, take side steps to HOB utilizing RW and BUE to assist. RLE cont to tala.  -MM           Transfer Assessment/Treatment    Transfer Assessment/Treatment  sit-stand transfer;stand-sit transfer  -MM        Comment (Transfers)  increase time and effort of task  -MM           Sit-Stand Transfer    Sit-Stand [place] (Transfers)  minimum assist (75% patient effort);2 person assist;verbal cues  -MM        Assistive Device (Sit-Stand Transfers)  walker, front-wheeled  -MM           Stand-Sit Transfer    Stand-Sit [place] (Transfers)  minimum assist (75% patient effort);2 person assist;verbal cues  -MM        Assistive Device (Stand-Sit Transfers)  walker, front-wheeled  -MM           ADL Assessment/Intervention    BADL Assessment/Intervention  upper body dressing;lower body dressing  -MM           Upper Body Dressing Assessment/Training    Upper Body Dressing [place] Level  don;doff;supervision;set up;verbal cues LSO  -MM        Upper Body Dressing Position  edge of bed sitting  -MM           Lower Body Dressing Assessment/Training    Lower Body Dressing [place] Level  don;doff;maximum assist (25% patient effort);verbal cues;set up  -MM        Lower Body Dressing Position  edge of bed sitting  -MM           BADL Safety/Performance    Impairments, BADL Safety/Performance  balance;endurance/activity tolerance;pain;shortness of breath;strength  -MM           General ROM    GENERAL ROM COMMENTS  AROM all 4 extremities WFLs  -MM           MMT (Manual Muscle Testing)    General MMT Comments  BUE 4/5  -MM           Motor Assessment/Interventions    Additional Documentation  Fine Motor Testing & Training (Group);Gross Motor Coordination (Group)  -MM           Gross Motor Coordination    Gross Motor Skill,  Impairments Detail  BUE intact  -MM           Fine Motor Testing & Training    Comment, Fine Motor Coordination  BUE intact  -MM           Sensory Assessment/Intervention    Sensory General Assessment  other (see comments) N/T reported B feet  -MM           Positioning and Restraints    Pre-Treatment Position  in bed  -MM        Post Treatment Position  bed  -MM        In Bed  notified nsg;fowlers;call light within reach;encouraged to call for assist;with family/caregiver;side rails up x2  -MM           Pain Assessment    Additional Documentation  Pain Scale: Numbers Pre/Post-Treatment (Group)  -MM           Pain Scale: Numbers Pre/Post-Treatment    Pain Scale: Numbers, Pretreatment  2/10  -MM        Pain Scale: Numbers, Post-Treatment  5/10  -MM        Pain Location  back  -MM        Pain Intervention(s)  Medication (See MAR);Repositioned;Ambulation/increased activity;Rest  -MM           Orthotics & Prosthetics Management    Orthosis Location  spinal orthosis  -MM        Additional Documentation  Orthosis Location (Row)  -MM           Spinal Orthosis Management    Type (Spinal Orthosis)  LSO (lumbar sacral orthosis)  -MM        Fabrication Comment (Spinal Orthosis)  fitted by goulds  -MM        Functional Design (Spinal Orthosis)  static orthosis  -MM        Therapeutic Indications (Spinal Orthosis)  pain management;post-op positioning/protection;rest/inflammation reduction;stabilization and support  -MM        Wearing Schedule (Spinal Orthosis)  wear when out of bed only  -MM        Orthosis Training (Spinal Orthosis)  patient and caregiver;all orthosis skills  -MM        Compliance/Wearing Issues (Spinal Orthosis)  patient/caregiver comprehend strategies;patient/caregiver comprehend rationale for orthosis;follow-up training required  -MM           Wound 08/07/20 1250 Right lower lumbar spine Incision    Wound - Properties Group Date first assessed: 08/07/20  -CF Time first assessed: 1250  -CF Present on Hospital  Admission: N  -CF Side: Right  -CF Orientation: lower  -CF Location: lumbar spine  -CF Primary Wound Type: Incision  -CF Additional Comments: closed incision. dressings applied.  -CF       Plan of Care Review    Plan of Care Reviewed With  patient;spouse  -MM        Progress  improving  -MM        Outcome Summary  OT evaluation completed. Pt sitting EOB upon entering the room. Pt reports pain 2/10 initially, 5/10 post tx in back. Pt was min x1-2 for bed mobility, t/f and functionally mobility at EOB with RW. Pt has notable R knee buckling in standing requiring assitance. Pt was supervision with set up and vc for donning/doffing LSO. pt max A to libby/doff socks. Pt would benefit from cont OT to address adls, functional mobility, safety and education. Recommended d/c SNF v. acute rehab pending pt progress with therapy. Pt and spouse are hopeful to d/c home tomorrow with assist and HH.  -MM           Clinical Impression (OT)    Date of Referral to OT  08/07/20  -MM        OT Diagnosis  impaired mobility and adls  -MM        Prognosis (OT Eval)  good  -MM        Functional Level at Time of Evaluation (OT Eval)  adequate  -MM        Patient/Family Goals Statement (OT Eval)  return home  -MM        Criteria for Skilled Therapeutic Interventions Met (OT Eval)  yes;treatment indicated  -MM        Rehab Potential (OT Eval)  good, to achieve stated therapy goals  -MM        Therapy Frequency (OT Eval)  5 times/wk  -MM        Predicted Duration of Therapy Intervention (Therapy Eval)  until d/c  -MM        Care Plan Review (OT)  evaluation/treatment results reviewed;care plan/treatment goals reviewed;risks/benefits reviewed;current/potential barriers reviewed;patient/other agree to care plan  -MM        Care Plan Review, Other Participant (OT Eval)  spouse  -MM        Anticipated Discharge Disposition (OT)  skilled nursing facility;inpatient rehabilitation facility  -MM           Vital Signs    O2 Delivery Pre Treatment  room  air  -MM        O2 Delivery Intra Treatment  room air  -MM        O2 Delivery Post Treatment  room air  -MM        Pre Patient Position  Sitting  -MM        Intra Patient Position  Standing  -MM        Post Patient Position  Supine  -MM           Planned OT Interventions    Planned Therapy Interventions (OT Eval)  activity tolerance training;adaptive equipment training;BADL retraining;functional balance retraining;occupation/activity based interventions;orthotic fabrication/fitting/training;patient/caregiver education/training;ROM/therapeutic exercise;strengthening exercise;transfer/mobility retraining  -MM           OT Goals    Dressing Goal Selection (OT)  dressing, OT goal 1  -MM        Toileting Goal Selection (OT)  toileting, OT goal 1  -MM           Dressing Goal 1 (OT)    Activity/Assistive Device (Dressing Goal 1, OT)  dressing skills, all  -MM        [place]/Cues Needed (Dressing Goal 1, OT)  conditional independence;set-up required;verbal cues required  -MM        Time Frame (Dressing Goal 1, OT)  10 days  -MM        Progress/Outcome (Dressing Goal 1, OT)  goal ongoing  -MM           Toileting Goal 1 (OT)    Activity/Device (Toileting Goal 1, OT)  toileting skills, all  -MM        [place] Level/Cues Needed (Toileting Goal 1, OT)  independent  -MM        Time Frame (Toileting Goal 1, OT)  10 days  -MM        Progress/Outcome (Toileting Goal 1, OT)  goal ongoing  -MM           Living Environment    Home Accessibility  stairs to enter home;other (see comments) walk in shower with 2 built in seats  -MM          User Key  (r) = Recorded By, (t) = Taken By, (c) = Cosigned By    Initials Name Effective Dates    MM Wayne Hook, OTR/L 07/05/20 -     CF Mindy Champagne RN 10/15/19 -          Occupational Therapy Education                 Title: PT OT SLP Therapies (In Progress)     Topic: Occupational Therapy (In Progress)     Point: ADL training (Done)     Description:   Instruct learner(s) on  proper safety adaptation and remediation techniques during self care or transfers.   Instruct in proper use of assistive devices.              Learning Progress Summary           Patient Acceptance, E, NR,VU by MM at 8/8/2020 1820    Comment:  OT roles, benefits, POC, d/c planning, RW safety, bracing and spinal precautions   Family Acceptance, E, NR,VU by MM at 8/8/2020 1820    Comment:  OT roles, benefits, POC, d/c planning, RW safety, bracing and spinal precautions                   Point: Home exercise program (Not Started)     Description:   Instruct learner(s) on appropriate technique for monitoring, assisting and/or progressing therapeutic exercises/activities.              Learner Progress:   Not documented in this visit.          Point: Precautions (Done)     Description:   Instruct learner(s) on prescribed precautions during self-care and functional transfers.              Learning Progress Summary           Patient Acceptance, E, NR,VU by MM at 8/8/2020 1820    Comment:  OT roles, benefits, POC, d/c planning, RW safety, bracing and spinal precautions   Family Acceptance, E, NR,VU by MM at 8/8/2020 1820    Comment:  OT roles, benefits, POC, d/c planning, RW safety, bracing and spinal precautions                   Point: Body mechanics (Done)     Description:   Instruct learner(s) on proper positioning and spine alignment during self-care, functional mobility activities and/or exercises.              Learning Progress Summary           Patient Acceptance, E, NR,VU by MM at 8/8/2020 1820    Comment:  OT roles, benefits, POC, d/c planning, RW safety, bracing and spinal precautions   Family Acceptance, E, NR,VU by MM at 8/8/2020 1820    Comment:  OT roles, benefits, POC, d/c planning, RW safety, bracing and spinal precautions                               User Key     Initials Effective Dates Name Provider Type Discipline     07/05/20 -  Wayne Hook E, OTR/L Occupational Therapist OT                  OT  Recommendation and Plan  Outcome Summary/Treatment Plan (OT)  Anticipated Discharge Disposition (OT): skilled nursing facility, inpatient rehabilitation facility  Planned Therapy Interventions (OT Eval): activity tolerance training, adaptive equipment training, BADL retraining, functional balance retraining, occupation/activity based interventions, orthotic fabrication/fitting/training, patient/caregiver education/training, ROM/therapeutic exercise, strengthening exercise, transfer/mobility retraining  Therapy Frequency (OT Eval): 5 times/wk  Plan of Care Review  Plan of Care Reviewed With: patient, spouse  Plan of Care Reviewed With: patient, spouse  Outcome Summary: OT evaluation completed. Pt sitting EOB upon entering the room. Pt reports pain 2/10 initially, 5/10 post tx in back. Pt was min x1-2 for bed mobility, t/f and functionally mobility at EOB with RW. Pt has notable R knee buckling in standing requiring assitance. Pt was supervision with set up and vc for donning/doffing LSO. pt max A to libby/doff socks. Pt would benefit from cont OT to address adls, functional mobility, safety and education. Recommended d/c SNF v. acute rehab pending pt progress with therapy. Pt and spouse are hopeful to d/c home tomorrow with assist and HH.    Outcome Measures     Row Name 08/08/20 1800             How much help from another is currently needed...    Putting on and taking off regular lower body clothing?  2  -MM      Bathing (including washing, rinsing, and drying)  2  -MM      Toileting (which includes using toilet bed pan or urinal)  2  -MM      Putting on and taking off regular upper body clothing  3  -MM      Taking care of personal grooming (such as brushing teeth)  3  -MM      Eating meals  4  -MM      AM-PAC 6 Clicks Score (OT)  16  -MM         Functional Assessment    Outcome Measure Options  AM-PAC 6 Clicks Daily Activity (OT)  -MM        User Key  (r) = Recorded By, (t) = Taken By, (c) = Cosigned By     Initials Name Provider Type    Wayne Redmond, OTR/L Occupational Therapist          Time Calculation:   Time Calculation- OT     Row Name 08/08/20 1536 08/08/20 1420          Time Calculation- OT    OT Start Time  1536  -MM  --  -MM     OT Stop Time  1618  -MM  --  -MM     OT Time Calculation (min)  42 min  -MM  --  -MM     Total Timed Code Minutes- OT  --  --  -MM     OT Non-Billable Time (min)  --  --  -MM     OT Received On  08/08/20  -MM  --  -MM     OT Goal Re-Cert Due Date  08/18/20  -MM  --  -MM       User Key  (r) = Recorded By, (t) = Taken By, (c) = Cosigned By    Initials Name Provider Type    Wayne Redmond, OTR/L Occupational Therapist        Therapy Charges for Today     Code Description Service Date Service Provider Modifiers Qty    94329204638 HC OT EVAL MOD COMPLEXITY 3 8/8/2020 Wayne Hook OTR/L GO 1               JAMEEL Villa/DEBORAH  8/8/2020

## 2020-08-08 NOTE — PLAN OF CARE
Problem: Patient Care Overview  Goal: Plan of Care Review  Outcome: Ongoing (interventions implemented as appropriate)  Flowsheets  Taken 8/8/2020 1651 by Arin Ramon RN  Progress: improving  Taken 8/8/2020 1537 by Wayne Hook, OTR/L  Plan of Care Reviewed With: patient;spouse  Outcome Summary: OT evaluation completed. Pt sitting EOB upon entering the room. Pt reports pain 2/10 initially, 5/10 post tx in back. Pt was min x1-2 for bed mobility, t/f and functionally mobility at EOB with RW. Pt has notable R knee buckling in standing requiring assitance. Pt was supervision with set up and vc for donning/doffing LSO. pt max A to libby/doff socks. Pt would benefit from cont OT to address adls, functional mobility, safety and education. Recommended d/c SNF v. acute rehab pending pt progress with therapy. Pt and spouse are hopeful to d/c home tomorrow with assist and HH.

## 2020-08-08 NOTE — THERAPY EVALUATION
Patient Name: Shabbir Solorio  : 1945    MRN: [id number]                              Today's Date: 2020       Admit Date: 2020    Visit Dx:     ICD-10-CM ICD-9-CM   1. Lumbar back pain M54.5 724.2   2. Gait abnormality R26.9 781.2     Patient Active Problem List   Diagnosis   • Lumbar radiculopathy   • DDD (degenerative disc disease), lumbar   • Lumbar back pain   • Chronic atrial fibrillation (CMS/HCC)   • Long term current use of anticoagulant therapy   • Nonischemic cardiomyopathy (CMS/HCC)   • KEYONA (obstructive sleep apnea)   • S/P AVR (aortic valve replacement)   • LV dysfunction   • Type 2 diabetes mellitus (CMS/HCC)   • Essential hypertension   • Hyperlipidemia   • CKD (chronic kidney disease) stage 3, GFR 30-59 ml/min (CMS/HCC)   • Hypothyroidism (acquired)     Past Medical History:   Diagnosis Date   • Arthritis    • Atrial fibrillation (CMS/HCC)    • Back pain    • Cancer (CMS/HCC)     bladder   • DDD (degenerative disc disease), lumbar 2020   • Diabetes mellitus (CMS/HCC)    • Disease of thyroid gland    • Function kidney decreased    • Heart valve replaced    • Hyperlipidemia    • Hypertension    • Lumbar back pain 2020   • Lumbar radiculopathy 2020   • Neuropathy    • Sleep apnea      Past Surgical History:   Procedure Laterality Date   • CARDIAC VALVE REPLACEMENT     • CYSTOSCOPY BLADDER BIOPSY     • VASECTOMY       General Information     Row Name 20 1536          PT Evaluation Time/Intention    Document Type  evaluation;other (see comments) s/p R L3-4 hemilaminectomy, facetectomy, discectomy and transforaminal lumbar interbody fusion, posterior spinal fusion w/ instrumentation  -     Mode of Treatment  physical therapy  -     Row Name 20 1536          General Information    Patient Profile Reviewed?  yes  -JE     Prior Level of Function  independent:;all household mobility;community mobility;ADL's;home management;driving;shopping;using stairs  -WYATT      Existing Precautions/Restrictions  fall;spinal;brace worn when out of bed  -WYATT     Barriers to Rehab  physical barrier  -WYATT     Row Name 08/08/20 1536          Relationship/Environment    Lives With  spouse  -WYATT     Name(s) of Who Lives With Patient  [place]  -WYATT     Row Name 08/08/20 1536          Resource/Environmental Concerns    Current Living Arrangements  home/apartment/condo walk in shower, built in shower seat  -WYATT     Row Name 08/08/20 1536          Home Main Entrance    Number of Stairs, Main Entrance  two  -JE     Stair Railings, Main Entrance  none  -     Row Name 08/08/20 1536          Cognitive Assessment/Intervention- PT/OT    Orientation Status (Cognition)  oriented x 4  -     Row Name 08/08/20 1536          Safety Issues, Functional Mobility    Safety Issues Affecting Function (Mobility)  safety precaution awareness  -     Impairments Affecting Function (Mobility)  balance;endurance/activity tolerance;pain;strength  -       User Key  (r) = Recorded By, (t) = Taken By, (c) = Cosigned By    Initials Name Provider Type    Zoila Nelson, PT Physical Therapist        Mobility     Row Name 08/08/20 1536          Bed Mobility Assessment/Treatment    Bed Mobility Assessment/Treatment  rolling right;scooting/bridging;sidelying-sit;sit-sidelying  -     Rolling Right [place] (Bed Mobility)  minimum assist (75% patient effort);contact guard;1 person assist;2 person assist;verbal cues  -WYATT     Scooting/Bridging [place] (Bed Mobility)  minimum assist (75% patient effort);2 person assist;verbal cues  -WYATT     Sidelying-Sit [place] (Bed Mobility)  minimum assist (75% patient effort);contact guard;verbal cues  -WYATT     Sit-Sidelying [place] (Bed Mobility)  minimum assist (75% patient effort);contact guard;verbal cues  -WYATT     Assistive Device (Bed Mobility)  bed rails;head of bed elevated;draw sheet  -     Comment (Bed Mobility)  pt sitting up on edge of bed w/ brace donned when  therapist entered room; removed brace and performed back to bed tfer, logrolling and tfer back to sitting w/ increase time and effort; LSO brace donned again before out of bed activity performed  -[facility] Name 08/08/20 1536          Transfer Assessment/Treatment    Comment (Transfers)  increase time and effort  -[facility] Name 08/08/20 1536          Sit-Stand Transfer    Sit-Stand [place] (Transfers)  minimum assist (75% patient effort);2 person assist;verbal cues  -     Assistive Device (Sit-Stand Transfers)  walker, front-wheeled  -[facility] Name 08/08/20 1536          Gait/Stairs Assessment/Training    Gait/Stairs Assessment/Training  gait/ambulation independence;gait/ambulation assistive device;distance ambulated;gait deviations  -     [place] Level (Gait)  minimum assist (75% patient effort);contact guard;2 person assist;verbal cues  -     Assistive Device (Gait)  walker, front-wheeled  -     Distance in Feet (Gait)  steps to L toward head of bed; marching in place  -     Pattern (Gait)  3-point  -     Deviations/Abnormal Patterns (Gait)  base of support, wide  -     Comment (Gait/Stairs)  R knee tends to buckle w/ wt bearing  -       User Key  (r) = Recorded By, (t) = Taken By, (c) = Cosigned By    Initials Name Provider Type    Zoila Nelson, PT Physical Therapist        Obj/Interventions     Row Name 08/08/20 1536          General ROM    GENERAL ROM COMMENTS  AROM all 4 extremities WFLs  -[facility] Name 08/08/20 1536          MMT (Manual Muscle Testing)    General MMT Comments  pt moves all 4 extremities against gravity I, no formal assessment this date  -[facility] Name 08/08/20 1536          Static Sitting Balance    Level of [place] (Unsupported Sitting, Static Balance)  independent  -     Sitting Position (Unsupported Sitting, Static Balance)  sitting on edge of bed  -[facility] Name 08/08/20 1536          Static Standing Balance    Level of [place]  (Supported Standing, Static Balance)  minimal assist, 75% patient effort;contact guard assist;2 person assist  -     Assistive Device Utilized (Supported Standing, Static Balance)  walkerreji  -WYATT     Row Name 08/08/20 1536          Dynamic Standing Balance    Level of [place], Reaches Outside Midline (Standing, Dynamic Balance)  minimal assist, 75% patient effort;2 person assist  -     Assistive Device Utilized (Supported Standing, Dynamic Balance)  reji leal     Row Name 08/08/20 1536          Sensory Assessment/Intervention    Sensory General Assessment  -- N/T reported in [facility]       User Key  (r) = Recorded By, (t) = Taken By, (c) = Cosigned By    Initials Name Provider Type    Zoila Nelson, PT Physical Therapist        Goals/Plan     [facility] Name 08/08/20 1536          Bed Mobility Goal 1 (PT)    Activity/Assistive Device (Bed Mobility Goal 1, PT)  rolling to left;rolling to right;scooting;sidelying to sit/sit to sidelying  -     [place] Level/Cues Needed (Bed Mobility Goal 1, PT)  standby assist  -     Time Frame (Bed Mobility Goal 1, PT)  long term goal (LTG);10 days  -     Progress/Outcomes (Bed Mobility Goal 1, PT)  goal ongoing  -[facility] Name 08/08/20 1536          Transfer Goal 1 (PT)    Activity/Assistive Device (Transfer Goal 1, PT)  sit-to-stand/stand-to-sit;bed-to-chair/chair-to-bed;walker, rolling  -     [place] Level/Cues Needed (Transfer Goal 1, PT)  contact guard assist;standby assist  -     Time Frame (Transfer Goal 1, PT)  long term goal (LTG);10 days  -     Progress/Outcome (Transfer Goal 1, PT)  goal ongoing  [facility] Name 08/08/20 1536          Gait Training Goal 1 (PT)    Activity/Assistive Device (Gait Training Goal 1, PT)  gait (walking locomotion);assistive device use;decrease fall risk;improve balance and speed;increase endurance/gait distance;normalize weight shifts;walker, rolling  -     [place] Level (Gait Training  Goal 1, PT)  contact guard assist;standby assist  -     Distance (Gait Goal 1, PT)   ft  -     Time Frame (Gait Training Goal 1, PT)  long term goal (LTG);10 days  -JE     Progress/Outcome (Gait Training Goal 1, PT)  goal ongoing  -     Row Name 08/08/20 1536          Stairs Goal 1 (PT)    Activity/Assistive Device (Stairs Goal 1, PT)  ascending stairs;descending stairs;step-to-step;cane, quad  -JE     [place] Level/Cues Needed (Stairs Goal 1, PT)  contact guard assist;minimum assist (75% or more patient effort)  -     Number of Stairs (Stairs Goal 1, PT)  2-3  -JE     Time Frame (Stairs Goal 1, PT)  long term goal (LTG);10 days  -JE     Progress/Outcome (Stairs Goal 1, PT)  goal ongoing  -     Row Name 08/08/20 1536          Patient Education Goal (PT)    Activity (Patient Education Goal, PT)  spinal precautions, LSO brace mgmt  -     [place]/Cues/Accuracy (Memory Goal 2, PT)  demonstrates adequately;independent;verbalizes understanding  -     Time Frame (Patient Education Goal, PT)  long term goal (LTG);10 days  -     Progress/Outcome (Patient Education Goal, PT)  goal ongoing  -       User Key  (r) = Recorded By, (t) = Taken By, (c) = Cosigned By    Initials Name Provider Type    Zoila Nelson, PT Physical Therapist        Clinical Impression     Row Name 08/08/20 1536          Pain Assessment    Additional Documentation  Pain Scale: Numbers Pre/Post-Treatment (Group)  -[facility] Name 08/08/20 1536          Pain Scale: Numbers Pre/Post-Treatment    Pain Scale: Numbers, Pretreatment  2/10  -     Pain Scale: Numbers, Post-Treatment  5/10  -     Pain Location  back  -     Pain Intervention(s)  Medication (See MAR);Repositioned;Ambulation/increased activity;Rest  -     Row Name 08/08/20 1536          Plan of Care Review    Plan of Care Reviewed With  patient;spouse  -     Progress  improving  -     Outcome Summary  PT eval completed.  Pt motivated and eager to  improve.  Sitting on side of bed w/ brace donned upon therapist arrival to room.  Performed bed mobility, tfers and took steps at bedside w/ assist of 1-2.  Pt w/ noted R knee buckling w/ wt bearing requiring increase assistance.  Pt will benefit from continued PT services to improve knowledge of spinal precautions, brace mgmt, to improve overall strength, balance and activity tolerance assisting w/ pain mgmt and to improve I w/ functional mobility using AD as appropriate improving safety awareness and reducing fall risk.  Recommend home w/ assist and possible home health at discharge if pt progresses as expected, however if pt continues to require increase assist w/ mobility, pt may need inpatient rehab in acute rehab or SNF.  Will follow for progress.  -WYATT     Row Name 08/08/20 1536          Physical Therapy Clinical Impression    Patient/Family Goals Statement (PT Clinical Impression)  improve strength and I  -JE     Criteria for Skilled Interventions Met (PT Clinical Impression)  yes;treatment indicated  -WYATT     Rehab Potential (PT Clinical Summary)  good, to achieve stated therapy goals  -     Predicted Duration of Therapy (PT)  until discharge or goals achieved  -WYATT     Row Name 08/08/20 1536          Vital Signs    O2 Delivery Pre Treatment  room air  -JE     O2 Delivery Intra Treatment  room air  -JE     O2 Delivery Post Treatment  room air  -JE     Pre Patient Position  Sitting  -JE     Intra Patient Position  Standing  -JE     Post Patient Position  Supine  -     Row Name 08/08/20 1536          Positioning and Restraints    Pre-Treatment Position  in bed  -JE     Post Treatment Position  bed  -JE     In Bed  notified nsg;fowlers;call light within reach;encouraged to call for assist;with family/caregiver;side rails up x2  -JE       User Key  (r) = Recorded By, (t) = Taken By, (c) = Cosigned By    Initials Name Provider Type    Zoila Nelson, PT Physical Therapist        Outcome Measures     Yessy  Name 08/08/20 1747          How much help from another person do you currently need...    Turning from your back to your side while in flat bed without using bedrails?  3  -JE     Moving from lying on back to sitting on the side of a flat bed without bedrails?  3  -JE     Moving to and from a bed to a chair (including a wheelchair)?  2  -JE     Standing up from a chair using your arms (e.g., wheelchair, bedside chair)?  2  -JE     Climbing 3-5 steps with a railing?  2  -JE     To walk in hospital room?  2  -JE     AM-PAC 6 Clicks Score (PT)  14  -JE     Row Name 08/08/20 1747          Functional Assessment    Outcome Measure Options  AM-PAC 6 Clicks Basic Mobility (PT)  -       User Key  (r) = Recorded By, (t) = Taken By, (c) = Cosigned By    Initials Name Provider Type    Zoila Nelson, PT Physical Therapist        Physical Therapy Education                 Title: PT OT SLP Therapies (Done)     Topic: Physical Therapy (Done)     Point: Mobility training (Done)     Description:   Instruct learner(s) on safety and technique for assisting patient out of bed, chair or wheelchair.  Instruct in the proper use of assistive devices, such as walker, crutches, cane or brace.              Patient Friendly Description:   It's important to get you on your feet again, but we need to do so in a way that is safe for you. Falling has serious consequences, and your personal safety is the most important thing of all.        When it's time to get out of bed, one of us or a family member will sit next to you on the bed to give you support.     If your doctor or nurse tells you to use a walker, crutches, a cane, or a brace, be sure you use it every time you get out of bed, even if you think you don't need it.    Learning Progress Summary           Patient Acceptance, E,TB,D, VU,DU,NR by  at 8/8/2020 4558    Comment:  Education re: purpose of PT/importance of act; education re: brace mgmt to include libby/doffing; education for  spinal precautions & safety/falls prevention; education for improved tech w/ bed mob, tfers & for proper use of rwx w/ standing gait activities                   Point: Body mechanics (Done)     Description:   Instruct learner(s) on proper positioning and spine alignment for patient and/or caregiver during mobility tasks and/or exercises.              Learning Progress Summary           Patient Acceptance, E,TB,D, VU,DU,NR by  at 8/8/2020 1746    Comment:  Education re: purpose of PT/importance of act; education re: brace mgmt to include libby/doffing; education for spinal precautions & safety/falls prevention; education for improved tech w/ bed mob, tfers & for proper use of rwx w/ standing gait activities                   Point: Precautions (Done)     Description:   Instruct learner(s) on prescribed precautions during mobility and gait tasks              Learning Progress Summary           Patient Acceptance, E,TB,D, VU,DU,NR by  at 8/8/2020 1746    Comment:  Education re: purpose of PT/importance of act; education re: brace mgmt to include libby/doffing; education for spinal precautions & safety/falls prevention; education for improved tech w/ bed mob, tfers & for proper use of rwx w/ standing gait activities                               User Key     Initials Effective Dates Name Provider Type Discipline     08/02/18 -  Zoila Andrea, PT Physical Therapist PT              PT Recommendation and Plan  Planned Therapy Interventions (PT Eval): balance training, bed mobility training, gait training, orthotic fitting/training, patient/family education, stair training, transfer training, other (see comments), strengthening(safety/falls prevention; pain mgmt, spinal precautions)  Outcome Summary/Treatment Plan (PT)  Anticipated Equipment Needs at Discharge (PT): (has built in shower seat, long handled sponge and shoe horn, has quad cane and rwx)  Anticipated Discharge Disposition (PT): home with assist, home  with home health, inpatient rehabilitation facility, skilled nursing facility  Plan of Care Reviewed With: patient, spouse  Progress: improving  Outcome Summary: PT eval completed.  Pt motivated and eager to improve.  Sitting on side of bed w/ brace donned upon therapist arrival to room.  Performed bed mobility, tfers and took steps at bedside w/ assist of 1-2.  Pt w/ noted R knee buckling w/ wt bearing requiring increase assistance.  Pt will benefit from continued PT services to improve knowledge of spinal precautions, brace mgmt, to improve overall strength, balance and activity tolerance assisting w/ pain mgmt and to improve I w/ functional mobility using AD as appropriate improving safety awareness and reducing fall risk.  Recommend home w/ assist and possible home health at discharge if pt progresses as expected, however if pt continues to require increase assist w/ mobility, pt may need inpatient rehab in acute rehab or SNF.  Will follow for progress.     Time Calculation:   PT Charges     Row Name 08/08/20 1750             Time Calculation    Start Time  1536  -      Stop Time  1618  -      Time Calculation (min)  42 min  -JE      PT Received On  08/08/20  -      PT Goal Re-Cert Due Date  08/18/20  -        User Key  (r) = Recorded By, (t) = Taken By, (c) = Cosigned By    Initials Name Provider Type    Zoila Nelson, PT Physical Therapist        Therapy Charges for Today     Code Description Service Date Service Provider Modifiers Qty    [id number] HC PT EVAL MOD COMPLEXITY 3 8/8/2020 Zoila Andrea, PT GP 1          PT G-Codes  Outcome Measure Options: AM-PAC 6 Clicks Basic Mobility (PT)  AM-PAC 6 Clicks Score (PT): 14    Zoila Andrea PT  8/8/2020

## 2020-08-09 VITALS
OXYGEN SATURATION: 95 % | BODY MASS INDEX: 40.29 KG/M2 | HEART RATE: 75 BPM | RESPIRATION RATE: 16 BRPM | DIASTOLIC BLOOD PRESSURE: 88 MMHG | TEMPERATURE: 98.5 F | SYSTOLIC BLOOD PRESSURE: 148 MMHG | HEIGHT: 73 IN | WEIGHT: 304 LBS

## 2020-08-09 LAB
ANION GAP SERPL CALCULATED.3IONS-SCNC: 12 MMOL/L (ref 5–15)
BASOPHILS # BLD AUTO: 0.07 10*3/MM3 (ref 0–0.2)
BASOPHILS NFR BLD AUTO: 0.5 % (ref 0–1.5)
BUN SERPL-MCNC: 15 MG/DL (ref 8–23)
BUN/CREAT SERPL: 10.1 (ref 7–25)
CALCIUM SPEC-SCNC: 8.9 MG/DL (ref 8.6–10.5)
CHLORIDE SERPL-SCNC: 99 MMOL/L (ref 98–107)
CO2 SERPL-SCNC: 28 MMOL/L (ref 22–29)
CREAT SERPL-MCNC: 1.48 MG/DL (ref 0.76–1.27)
DEPRECATED RDW RBC AUTO: 43.5 FL (ref 37–54)
EOSINOPHIL # BLD AUTO: 0.49 10*3/MM3 (ref 0–0.4)
EOSINOPHIL NFR BLD AUTO: 3.6 % (ref 0.3–6.2)
ERYTHROCYTE [DISTWIDTH] IN BLOOD BY AUTOMATED COUNT: 12.5 % (ref 12.3–15.4)
GFR SERPL CREATININE-BSD FRML MDRD: 46 ML/MIN/1.73
GLUCOSE SERPL-MCNC: 138 MG/DL (ref 65–99)
HCT VFR BLD AUTO: 33.6 % (ref 37.5–51)
HGB BLD-MCNC: 10.9 G/DL (ref 13–17.7)
IMM GRANULOCYTES # BLD AUTO: 0.06 10*3/MM3 (ref 0–0.05)
IMM GRANULOCYTES NFR BLD AUTO: 0.4 % (ref 0–0.5)
INR PPP: 1.32 (ref 0.91–1.09)
LYMPHOCYTES # BLD AUTO: 1.79 10*3/MM3 (ref 0.7–3.1)
LYMPHOCYTES NFR BLD AUTO: 13 % (ref 19.6–45.3)
MCH RBC QN AUTO: 30.7 PG (ref 26.6–33)
MCHC RBC AUTO-ENTMCNC: 32.4 G/DL (ref 31.5–35.7)
MCV RBC AUTO: 94.6 FL (ref 79–97)
MONOCYTES # BLD AUTO: 1.36 10*3/MM3 (ref 0.1–0.9)
MONOCYTES NFR BLD AUTO: 9.9 % (ref 5–12)
NEUTROPHILS NFR BLD AUTO: 72.6 % (ref 42.7–76)
NEUTROPHILS NFR BLD AUTO: 9.97 10*3/MM3 (ref 1.7–7)
NRBC BLD AUTO-RTO: 0 /100 WBC (ref 0–0.2)
PLATELET # BLD AUTO: 169 10*3/MM3 (ref 140–450)
PMV BLD AUTO: 11.1 FL (ref 6–12)
POTASSIUM SERPL-SCNC: 4.1 MMOL/L (ref 3.5–5.2)
PROTHROMBIN TIME: 16 SECONDS (ref 11.9–14.6)
RBC # BLD AUTO: 3.55 10*6/MM3 (ref 4.14–5.8)
SODIUM SERPL-SCNC: 139 MMOL/L (ref 136–145)
WBC # BLD AUTO: 13.74 10*3/MM3 (ref 3.4–10.8)

## 2020-08-09 PROCEDURE — 85610 PROTHROMBIN TIME: CPT | Performed by: FAMILY MEDICINE

## 2020-08-09 PROCEDURE — 80048 BASIC METABOLIC PNL TOTAL CA: CPT | Performed by: FAMILY MEDICINE

## 2020-08-09 PROCEDURE — 97116 GAIT TRAINING THERAPY: CPT

## 2020-08-09 PROCEDURE — 85025 COMPLETE CBC W/AUTO DIFF WBC: CPT | Performed by: FAMILY MEDICINE

## 2020-08-09 RX ADMIN — OXYCODONE HYDROCHLORIDE AND ACETAMINOPHEN 2 TABLET: 7.5; 325 TABLET ORAL at 10:31

## 2020-08-09 RX ADMIN — DOCUSATE SODIUM 50 MG AND SENNOSIDES 8.6 MG 1 TABLET: 8.6; 5 TABLET, FILM COATED ORAL at 09:03

## 2020-08-09 RX ADMIN — SODIUM CHLORIDE 100 ML/HR: 9 INJECTION, SOLUTION INTRAVENOUS at 03:45

## 2020-08-09 RX ADMIN — OXYCODONE HYDROCHLORIDE AND ACETAMINOPHEN 2 TABLET: 7.5; 325 TABLET ORAL at 02:39

## 2020-08-09 RX ADMIN — GLIPIZIDE 5 MG: 5 TABLET ORAL at 09:02

## 2020-08-09 RX ADMIN — GUAIFENESIN 1200 MG: 600 TABLET, EXTENDED RELEASE ORAL at 09:02

## 2020-08-09 RX ADMIN — METFORMIN HYDROCHLORIDE 1000 MG: 500 TABLET ORAL at 09:02

## 2020-08-09 RX ADMIN — METOPROLOL SUCCINATE 100 MG: 100 TABLET, EXTENDED RELEASE ORAL at 09:02

## 2020-08-09 RX ADMIN — SODIUM CHLORIDE, PRESERVATIVE FREE 3 ML: 5 INJECTION INTRAVENOUS at 09:03

## 2020-08-09 RX ADMIN — POTASSIUM CHLORIDE 20 MEQ: 1.5 POWDER, FOR SOLUTION ORAL at 09:02

## 2020-08-09 RX ADMIN — FUROSEMIDE 40 MG: 40 TABLET ORAL at 09:02

## 2020-08-09 RX ADMIN — LEVOTHYROXINE SODIUM 175 MCG: 150 TABLET ORAL at 06:07

## 2020-08-09 RX ADMIN — OXYCODONE HYDROCHLORIDE AND ACETAMINOPHEN 1000 MG: 500 TABLET ORAL at 09:03

## 2020-08-09 NOTE — DISCHARGE PLACEMENT REQUEST
"OsminShabbir thomson (75 y.o. 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)   Code Status:  CPR    Ht:  185.4 cm (73\")   Wt:  138 kg (304 lb)    Admission Cmt:  None   Principal Problem:  Lumbar radiculopathy [M54.16]                 Active Insurance as of 8/7/2020     Primary Coverage     Payor Plan Insurance Group Employer/Plan Group    MEDICARE MEDICARE A & B      Payor Plan Address Payor Plan Phone Number Payor Plan Fax Number Effective Dates    [address] [phone]  7/1/2011 - None Entered    [facility] [id number]       Subscriber Name Subscriber Birth Date Member ID       SHABBIR WINTER 1945 [id number]           Secondary Coverage     Payor Plan Insurance Group Employer/Plan Group    ANTHEM BLUE CROSS [facility] SUPP [id number]     Payor Plan Address Payor Plan Phone Number Payor Plan Fax Number Effective Dates    [address]   7/1/2011 - None Entered    [facility] [id number]       Subscriber Name Subscriber Birth Date Member ID       SHABBIR WINTER 1945 [id number]                 Emergency Contacts      (Rel.) Home Phone Work Phone Mobile Phone    ISMAEL WINTER (Spouse) -- -- [phone]            Insurance Information                MEDICARE/MEDICARE A & B Phone: [phone]    Subscriber: NewtonDesmondan Subscriber#: [id number]    Group#:  Precert#:         SHERITA HE/[facility] SUPP Phone:     " Subscriber: Osmin Shabbir Subscriber#: [id number]    Group#: [id number] Precert#:              History & Physical      MIKI Cloud MD at 08/07/20 0845          H&P reviewed. The patient was examined and there are no changes to the H&P.          Electronically signed by MIKI Cloud MD at 08/07/20 0845   Source Note     Scan on 8/7/2020 by [employer], [employer]: H&amp;P, OIWK, 07/25/2020          Electronically signed by [employer], [employer] at 07/30/20 1101             H&P signed by [employer], [employer] at 07/30/20 1101      Scan on 8/7/2020 by [employer], [employer]: H&amp;P, OIWK, 07/25/2020          Electronically signed by [employer], [employer] at 07/30/20 1101       Referral Orders (last 24 hours) (24h ago, onward)    None           Operative/Procedure Notes (last 7 days) (Notes from 08/02/20 1125 through 08/09/20 1125)      MIKI Cloud MD at 08/07/20 1142        LUMBAR LAMINECTOMY TRANSFORAMINAL LUMBAR INTERBODY FUSION  Procedure Note    Shabbir Solorio  8/7/2020    Pre-op Diagnosis:     1. Back pain.  2. Severe right anterior thigh and leg radiculopathy.  3. Multilevel lumbar degenerative disk disease.  4. Multilevel lumbar facet arthropathy.  5. Large foraminal disk herniation , right L3-4.  6. Severe central and right-sided foraminal stenosis, L3-4.  7. Atrial fibrillation, on Coumadin.  8. History of bladder cancer.    Post-op Diagnosis:    same    Procedure/CPT® Codes:    1.  Right L3-4 hemilaminectomy decompression with complete facetectomy, neuroforaminotomy, discectomy  2.  TLIF L3-4  3.  Posterior spinal fusion L3-4  4.  Posterior spinal instrumentation L3-4 ([facility] pedicle screws and jessie)  5.  Use of PEEK interbody biomechanical device for fusion L3-4 ([employer] PEEK spacer)  6.  Use of locally obtained autograft bone for fusion L3-4  7.  Use of allograft bone matrix for fusion L3-4  8.  Use of allograft liquid for fusion L3-4 (BioBurst)  9.  Use of operating room microscope for  decompression and microdissection  10.  Use of fluoroscopy for confirmation of surgical level, placement of instrumentation and PEEK spacer  11.  Intraoperative neuromonitoring with pedicle screw stimulation    Anesthesia: General     Surgeon: LIZANDRO Cloud MD     Assistant:   Imtiaz Pinon PA-C     Estimated Blood Loss:  50 mL     Complications: None     Condition: Stable to PACU.     Indications:    The patient is a 75-year-old who sees Dr. Pilo Costa for medical issues.  He presented to the office with complaints of back pain but more importantly severe right anterior thigh and leg radiculopathy.  While he was noted to have multilevel lumbar degenerative disc disease as well as multilevel lumbar facet arthropathy, more importantly he had a large foraminal disc herniation on the right side of L3-4 causing severe central and right-sided foraminal stenosis.  This was felt to be contributing to his severe right anterior thigh and leg radiculopathy.    After failing conservative measures, it was mutually decided that surgery was the best option.  Risks benefits and complications of surgery were discussed with the patient, and the patient appeared well informed and wished to proceed.  We specifically discussed the risk of infection, blood loss, nerve root injury, CSF leak, and the possibility of incomplete resolution of symptoms.  We also discussed the possible risk of a nonunion and the potential need for additional surgery in the event of a pseudoarthrosis or hardware failure.     Operative Procedure:     After obtaining informed consent and verifying the correct level, the patient was brought to the operating room.  A general anesthetic was provided by the anesthesia service with the assistance of an endotracheal tube.  Once this was appropriately positioned and secured, the patient was carefully rotated into the prone position on a David frame.  All bony prominences were well-padded.  The lumbar region was  then prepped and draped in the usual sterile fashion.  A surgical timeout was taken to confirm this was the correct patient, we were working at the correct levels, and that preoperative antibiotics were given in a timely fashion.     Using fluoroscopy for guidance, a right sided Wiltsey incision was created overlying the L3-4 interlaminar space.  Dissection was carried through subcutaneous tissues using Bovie cautery.  The fascia was divided in line with the incision and blunt dissection was carried between the multifidus and longissimus.  Dissection was carried laterally exposing the transverse process of L3 and L4.  Dissection was carried medially exposing the interlaminar space of L3-4 and self retaining retractors were placed for continuous exposure.  A forward angled curette was placed under the lamina of L3, and fluoroscopy was used to confirm that we were at the correct level.  The microscope was then positioned for the remaining portion of the procedure.    The decompression was started using a rongeur to remove most of the L3 lamina and the L3-4 facet joint.  Forward angled curettes were used to free up the remaining L3 lamina releasing the ligamentum flavum.  The remaining L3 lamina was removed using Kerrisons through the region of the pars out laterally.  The ligamentum flavum was dissected free from the underlying dura using forward angled curettes and resected using Kerrisons.  The central dural sac as well as the traversing L4 nerve root were easily identified.  We then worked superiorly continuing the hemilaminectomy with Kerrisons until we were able to identify the takeoff of the L3 nerve root.  Dissection was then carried laterally finishing the facetectomy with Kerrisons along the track of the L3 nerve root.  The L3 nerve root itself was being compressed by the facet arthropathy but also from a large disc herniation at the L3-4 disc space.  This disc herniation was displacing the nerve root  superiorly contributing to stenosis along the inferior edge of the L3 pedicle.  It was also causing rather severe stenosis at the leading edge of the L4 lamina.  We also noted a large free fragment superior to the disc space in the axilla of the L3 nerve root. In order to adequately decompress the L3 nerve root, I used a 15 blade scapel to create an annulotomy in the disc. Backdown curettes and pituitaries were then used to retrieve disc material.  The decompression of the exiting L3 nerve root was much more involved than what is usually required for a transforaminal lumbar interbody fusion by itself due to the severe facet arthropathy and the large disc herniation below.     After completing the decompression of L3, I retracted the L4 nerve root and the central dural sac medially exposing more of the L3-4 disc space.  Once again, I used the scalpel to extend the annulotomy medially allowing more access to the remaining disc space.   A series of disc space distractors were placed into the disc space which allowed preparation of the endplates and removal of all the disc material.  This was accomplished using backdown curettes, endplate scrapers, pituitaries, and Kerrisons.  The disc space distractors were then sequentially increased up to an 11 mm distractor and this was felt to be the best size for a PEEK spacer for restoring disc height and assisting with the fusion.  The disc space was then thoroughly irrigated with saline solution.     The disc space was then packed with a combination of locally obtained autograft bone and allograft bone matrix mixed with an allograft liquid called BioBurst.  More of this bone was then packed into an 11 mm spacer from the [employer] PEEK spacer set.  The spacer itself measured 11 mm x 26 mm.  Once this spacer was properly packed as tightly as possible, it was malleted into the L3-4 disc space under fluoroscopic guidance.  It was placed as a PEEK interbody biomechanical device  to assist with fusion.   More locally obtained autograft bone was then packed into the L3-4 disc space to augment the fusion as well.  Bleeding in the epidural space was controlled using thrombin with Gelfoam powder and bipolar cautery.  The entire area was then thoroughly irrigated with saline solution.     At this point, I used a high-speed bur to decorticate the transverse process of L3 and L4.  The lateral gutter was then filled with the remaining bone allograft matrix and locally obtained autograft bone.  This constituted a posterior lateral fusion of L3-4.     Under fluoroscopic guidance, I then created starting holes for the placement of pedicle screws.  A pedicle probe was used to gain access through the starting hole into the anterior vertebral body.  Each pedicle tract was palpated with a ball-tipped feeler to ensure that there was no medial or lateral breach.  I placed a 6.5 mm x 55 mm screw into each pedicle at L3 and L4.  After confirming the screws were properly positioned under fluoroscopic guidance, an appropriate-sized jessie was chosen to span the pedicle screws.  This was held into position using set screws and tightened using the appropriate antitorque wrench and torque limiting screwdriver provided by [facility].     The wound was then copiously irrigated with saline solution.  Fluoroscopy was used to confirm the screws and interbody spacer were properly positioned.  There was excellent restoration of disc height compared to preoperative films.       The incision was then irrigated a final time, and a thorough inspection was done to ensure that we had adequate hemostasis.  Again bleeding was controlled using thrombin with Gelfoam powder and bipolar cautery.  Closure was then accomplished by reapproximating the fascia with a #1 Vicryl.  Immediate subcutaneous tissues were closed with a 2-0 Vicryl.  Skin closure was then augmented using Mastisol and Steri-Strips.  The wound was then sterilely dressed with  Bioclusive dressings.  The patient was carefully rotated supine onto a hospital gurney, extubated, and sent to the recovery room in good stable condition. The patient tolerated the procedure well, there were no complications.  We estimated blood loss to be approximately 50 mL during the procedure, and the patient remained hemodynamically stable.     Imtiaz Pinon PA-C provided critical assistance during the procedure.  His assistance was medically necessary in order to allow the procedure to occur in the most safe and efficient manner.  He assisted not only with patient positioning and wound closure, but more importantly with retraction of delicate neurovascular structures during the decompression of L3-4.  He also assisted during the placement of the bone graft, interbody spacer and instrumentation to obtain a fusion across L3-4.    LIZANDRO Cloud MD     Date: 8/7/2020  Time: 15:48      Electronically signed by MIKI Cloud MD at 08/07/20 1548       Physician Progress Notes (last 24 hours) (Notes from 08/08/20 1125 through 08/09/20 1125)    No notes of this type exist for this encounter.              Discharge Summary      Shamar Hernandez PA-C at 08/08/20 1345            Date of Discharge:  8/8/2020    Admission Diagnosis: M51.26    Discharge Diagnosis:   1. Back pain.  2. Severe right anterior thigh and leg radiculopathy.  3. Multilevel lumbar degenerative disk disease.  4. Multilevel lumbar facet arthropathy.  5. Large foraminal disk herniation , right L3-4.  6. Severe central and right-sided foraminal stenosis, L3-4.  7. Atrial fibrillation, on Coumadin.  8. History of bladder cancer.  9.  Status post right L3-4 hemilaminectomy decompression with complete facetectomy, neuroforaminotomy, discectomy,  TLIF L3-4, PSF with instrumentation L3-4, 8/7/2020    Consults During Admission: None    Hospital Course  Patient is a 75 y.o. male Known to our practice. Admitted for the above lumbar fusion.  This has  been well tolerated and the patient will be discharged home today in good stable condition if he does well with physical therapy with instructions for brace when out of bed.  No driving until directed.  Patient will follow-up with Dr. Cloud's clinic in two weeks. They will call if problems arise.         Condition on Discharge:  STABLE    Vital Signs  Temp:  [97.6 °F (36.4 °C)-98.9 °F (37.2 °C)] 98.7 °F (37.1 °C)  Heart Rate:  [76-97] 76  Resp:  [14-20] 16  BP: (116-168)/() 116/57  Arterial Line BP: (137-155)/(64-74) 137/64    Physical Exam:   Alert and oriented ×3, no acute distress, grossly neurovascularly intact, vital signs stable, dressing clean dry and intact, moving all extremities without focal deficit      Discharge Disposition  Home or Self Care    Discharge Medications     Discharge Medications      New Medications      Instructions Start Date   oxyCODONE-acetaminophen 7.5-325 MG per tablet  Commonly known as:  PERCOCET  Replaces:  oxyCODONE-acetaminophen 5-325 MG per tablet   1 tablet, Oral, Every 4 Hours PRN         Continue These Medications      Instructions Start Date   aspirin 81 MG chewable tablet   81 mg, Oral, Daily      atorvastatin 10 MG tablet  Commonly known as:  LIPITOR   10 mg, Oral, Daily      fish oil 1200 MG capsule capsule   1,200 mg, Oral, Daily      furosemide 40 MG tablet  Commonly known as:  LASIX   40 mg, Oral, 2 Times Daily      glipizide 5 MG tablet  Commonly known as:  GLUCOTROL   5 mg, Oral, Daily      levothyroxine 175 MCG tablet  Commonly known as:  SYNTHROID, LEVOTHROID   175 mcg, Oral, Daily      metFORMIN 1000 MG tablet  Commonly known as:  GLUCOPHAGE   1,000 mg, Oral, 2 Times Daily With Meals      metoprolol succinate  MG 24 hr tablet  Commonly known as:  TOPROL-XL   100 mg, Oral, 2 times daily      montelukast 10 MG tablet  Commonly known as:  SINGULAIR   10 mg, Oral, Nightly      Mucinex 600 MG 12 hr tablet  Generic drug:  guaiFENesin   1,200 mg, Oral,  Daily      potassium chloride 20 MEQ packet  Commonly known as:  KLOR-CON   20 mEq, Oral, Daily      vitamin C 500 MG tablet  Commonly known as:  ASCORBIC ACID   500 mg, Oral, Daily      vitamin D 1.25 MG (49951 UT) capsule capsule  Commonly known as:  ERGOCALCIFEROL   50,000 Units, Oral, Weekly      warfarin 5 MG tablet  Commonly known as:  COUMADIN   5 mg, Oral, Daily Warfarin         Stop These Medications    oxyCODONE-acetaminophen 5-325 MG per tablet  Commonly known as:  PERCOCET  Replaced by:  oxyCODONE-acetaminophen 7.5-325 MG per tablet            Discharge Diet: Resume Home diet, advance as tolerated    Activity at Discharge: Resume home activity advace as tolerated, no lifting, no twisting, no bending, brace as directed, no driving until directed.     Follow-up Appointments  Followup with PCP within one week  Followup [facility] at 2weeks post-op         Shamar Hernandez PA-C  08/08/20  13:45                Electronically signed by Shamar Hernandez PA-C at 08/08/20 1347       Discharge Order (From admission, onward)     Start     Ordered    08/08/20 1343  Discharge patient  Once     Expected Discharge Date:  08/08/20    Expected Discharge Time:  Evening    Discharge Disposition:  Home or Self Care    Physician of Record for Attribution - Please select from Treatment Team:  MIKI GRAFF [[id number]]    Review needed by CMO to determine Physician of Record:  No       Question Answer Comment   Physician of Record for Attribution - Please select from Treatment Team MIKI GRAFF    Review needed by CMO to determine Physician of Record No        08/08/20 9667

## 2020-08-09 NOTE — PLAN OF CARE
Problem: Patient Care Overview  Goal: Plan of Care Review  Flowsheets (Taken 8/9/2020 0944)  Progress: improving  Plan of Care Reviewed With: patient  Outcome Summary: Pt c/o back pain only when walking 8/10. Pt was mod x 1 assist with HOB up and using bed rail. Pt was min x 1 to stand and walked 30ft with RW min x 1. Pt states if dc'd home he could sleep in his recliner. Pt would benefit from inpatient rehab if agrees to this.

## 2020-08-09 NOTE — PROGRESS NOTES
Continued Stay Note   [place]     Patient Name: Shabbir Solorio  MRN: [id number]  Today's Date: 8/9/2020    Admit Date: 8/7/2020    Discharge Plan     Row Name 08/09/20 1126       Plan    Final Discharge Disposition Code  06 - home with home health care    Final Note  Pt dc'd home today. Order for HH. Pt chose [facility]. Faxed HH orders and referral to [phone]. Pt denies any other dc needs.         Discharge Codes    No documentation.       Expected Discharge Date and Time     Expected Discharge Date Expected Discharge Time    Aug 8, 2020             DAVID Menezes

## 2020-08-09 NOTE — THERAPY DISCHARGE NOTE
Acute Care - Physical Therapy Discharge Summary  [facility]       Patient Name: Shabbir Solorio  : 1945  MRN: [id number]    Today's Date: 2020  Onset of Illness/Injury or Date of Surgery: 20       Referring Physician: Dr. Cloud      Admit Date: 2020      PT Recommendation and Plan    Visit Dx:    ICD-10-CM ICD-9-CM   1. Lumbar back pain M54.5 724.2   2. Gait abnormality R26.9 781.2   3. Impaired mobility and ADLs Z74.09 V49.89    Z78.9        Outcome Measures     Row Name 20 1800             How much help from another is currently needed...    Putting on and taking off regular lower body clothing?  2  -MM      Bathing (including washing, rinsing, and drying)  2  -MM      Toileting (which includes using toilet bed pan or urinal)  2  -MM      Putting on and taking off regular upper body clothing  3  -MM      Taking care of personal grooming (such as brushing teeth)  3  -MM      Eating meals  4  -MM      AM-PAC 6 Clicks Score (OT)  16  -MM         Functional Assessment    Outcome Measure Options  AM-PAC 6 Clicks Daily Activity (OT)  -MM        User Key  (r) = Recorded By, (t) = Taken By, (c) = Cosigned By    Initials Name Provider Type    MM Wayne Hook, OTR/L Occupational Therapist          PT Charges     Row Name 20 0948             Time Calculation    Start Time  0822  -AE      Stop Time  0847  -AE      Time Calculation (min)  25 min  -AE      PT Received On  20  -AE      PT Goal Re-Cert Due Date  20  -AE         Time Calculation- PT    Total Timed Code Minutes- PT  25 minute(s)  -AE         Timed Charges    86119 - Gait Training Minutes   25  -AE        User Key  (r) = Recorded By, (t) = Taken By, (c) = Cosigned By    Initials Name Provider Type    Kelly Ceballos, PTA Physical Therapy Assistant          Rehab Goal Summary     Row Name 20 1205             Bed Mobility Goal 1 (PT)    Activity/Assistive Device (Bed Mobility Goal 1, PT)  rolling to  left;rolling to right;scooting;sidelying to sit/sit to sidelying  -AB      [place] Level/Cues Needed (Bed Mobility Goal 1, PT)  standby assist  -AB      Time Frame (Bed Mobility Goal 1, PT)  long term goal (LTG);10 days  -AB      Progress/Outcomes (Bed Mobility Goal 1, PT)  goal not met  -AB         Transfer Goal 1 (PT)    Activity/Assistive Device (Transfer Goal 1, PT)  sit-to-stand/stand-to-sit;bed-to-chair/chair-to-bed;walker, rolling  -AB      [place] Level/Cues Needed (Transfer Goal 1, PT)  contact guard assist;standby assist  -AB      Time Frame (Transfer Goal 1, PT)  long term goal (LTG);10 days  -AB      Progress/Outcome (Transfer Goal 1, PT)  goal not met  -AB         Gait Training Goal 1 (PT)    Activity/Assistive Device (Gait Training Goal 1, PT)  gait (walking locomotion);assistive device use;decrease fall risk;improve balance and speed;increase endurance/gait distance;normalize weight shifts;walker, rolling  -AB      [place] Level (Gait Training Goal 1, PT)  contact guard assist;standby assist  -AB      Distance (Gait Goal 1, PT)   ft  -AB      Time Frame (Gait Training Goal 1, PT)  long term goal (LTG);10 days  -AB      Progress/Outcome (Gait Training Goal 1, PT)  goal not met  -AB         Stairs Goal 1 (PT)    Activity/Assistive Device (Stairs Goal 1, PT)  ascending stairs;descending stairs;step-to-step;cane, quad  -AB      [place] Level/Cues Needed (Stairs Goal 1, PT)  contact guard assist;minimum assist (75% or more patient effort)  -AB      Number of Stairs (Stairs Goal 1, PT)  2-3  -AB      Time Frame (Stairs Goal 1, PT)  long term goal (LTG);10 days  -AB      Progress/Outcome (Stairs Goal 1, PT)  goal not met  -AB         Patient Education Goal (PT)    Activity (Patient Education Goal, PT)  spinal precautions, LSO brace mgmt  -AB      [place]/Cues/Accuracy (Memory Goal 2, PT)  demonstrates adequately;independent;verbalizes understanding  -AB      Time Frame (Patient  Education Goal, PT)  long term goal (LTG);10 days  -AB      Progress/Outcome (Patient Education Goal, PT)  goal met  -AB        User Key  (r) = Recorded By, (t) = Taken By, (c) = Cosigned By    Initials Name Provider Type Discipline    Randee Fonseca, PTA Physical Therapy Assistant PT              PT Discharge Summary  Anticipated Discharge Disposition (PT): home with assist, home with home health, inpatient rehabilitation facility, skilled nursing facility  Reason for Discharge: Discharge from facility  Outcomes Achieved: Refer to plan of care for updates on goals achieved  Discharge Destination: Home with assist      Randee Josue, PTA   8/9/2020

## 2020-08-09 NOTE — PLAN OF CARE
Problem: Patient Care Overview  Goal: Plan of Care Review  Outcome: Ongoing (interventions implemented as appropriate)  Flowsheets (Taken 8/9/2020 0402)  Progress: no change  Plan of Care Reviewed With: patient  Outcome Summary: VSS, pt rested well through night and c/o pain med prn with relief. Pt is still requiring at least 2 asst OOB, feels too weak to ambulate at this time. Pt refuses to wear o2 while sleeping, although not sure the good it would do him as he is a mouth breather. His sats would drop to low 80s in deep sleep. Pt and I discussed sleep apnea, says he's been told he has it before but he said he will not wear a Cpap for this. I discussed/educated pt on the need for sufficient o2, benefits and risks. Safety maintained and will continue to monitor.

## 2020-08-09 NOTE — THERAPY TREATMENT NOTE
Acute Care - Physical Therapy Treatment Note  [facility]     Patient Name: Shabbir Solorio  : 1945  MRN: [id number]  Today's Date: 2020  Onset of Illness/Injury or Date of Surgery: 20     Referring Physician: Dr. Cloud    Admit Date: 2020    Visit Dx:    ICD-10-CM ICD-9-CM   1. Lumbar back pain M54.5 724.2   2. Gait abnormality R26.9 781.2   3. Impaired mobility and ADLs Z74.09 V49.89    Z78.9      Patient Active Problem List   Diagnosis   • Lumbar radiculopathy   • DDD (degenerative disc disease), lumbar   • Lumbar back pain   • Chronic atrial fibrillation (CMS/HCC)   • Long term current use of anticoagulant therapy   • Nonischemic cardiomyopathy (CMS/[facility])   • KEYONA (obstructive sleep apnea)   • S/P AVR (aortic valve replacement)   • LV dysfunction   • Type 2 diabetes mellitus (CMS/[facility])   • Essential hypertension   • Hyperlipidemia   • CKD (chronic kidney disease) stage 3, GFR 30-59 ml/min (CMS/[facility])   • Hypothyroidism (acquired)       Therapy Treatment    Rehabilitation Treatment Summary     Row Name 2050             Treatment Time/Intention    Discipline  physical therapy assistant  -AE      Document Type  therapy note (daily note)  -AE      Subjective Information  complains of;pain  -AE      Existing Precautions/Restrictions  fall;spinal;brace worn when out of bed  -AE      Recorded by [AE] Kelly Mack, PTA 20      Row Name 2050             Bed Mobility Assessment/Treatment    [place]id[place]ing-Sit [place] (Bed Mobility)  moderate assist (50% patient effort);verbal cues  -AE      Assistive Device (Bed Mobility)  bed rails;head of bed elevated  -AE      Recorded by [AE] Kelly Mack, PTA 20      Row Name 2050             Sit-Stand Transfer    Sit-Stand [place] (Transfers)  minimum assist (75% patient effort);verbal cues;contact guard  -AE      Recorded by [AE] Kelly Mack, PTA 20      Row Name 2050              Stand-Sit Transfer    Stand-Sit [place] (Transfers)  verbal cues;contact guard  -AE      Recorded by [AE] Kelly Mack, PTA 08/09/20 0944      Row Name 08/09/20 0850             Gait/Stairs Assessment/Training    [place] Level (Gait)  contact guard  -AE      Assistive Device (Gait)  walker, front-wheeled  -AE      Distance in Feet (Gait)  30  -AE      Bilateral Gait Deviations  forward flexed posture  -AE      Recorded by [AE] Kelly Mack, PTA 08/09/20 0944      Row Name 08/09/20 0850             Positioning and Restraints    Pre-Treatment Position  in bed  -AE      Post Treatment Position  chair  -AE      In Chair  sitting;call light within reach  -AE      Recorded by [AE] Kelly Mack PTA 08/09/20 0944      Row Name 08/09/20 0850             Pain Scale: Numbers Pre/Post-Treatment    Pain Scale: Numbers, Pretreatment  0/10 - no pain  -AE      Pain Scale: Numbers, Post-Treatment  8/10 with walking  -AE      Recorded by [AE] Kelly Mack, PTA 08/09/20 0944      Row Name                Wound 08/07/20 1250 Right lower lumbar spine Incision    Wound - Properties Group Date first assessed: 08/07/20 [CF] Time first assessed: 1250 [CF] Present on Hospital Admission: N [CF] Side: Right [CF] Orientation: lower [CF] Location: lumbar spine [CF] Primary Wound Type: Incision [CF] Additional Comments: closed incision. dressings applied. [CF] Recorded by:  [CF] Mindy Champagne RN 08/07/20 1251      User Key  (r) = Recorded By, (t) = Taken By, (c) = Cosigned By    Initials Name Effective Dates Discipline    AE Kelly Mack, PTA 06/22/15 -  PT    CF Mindy Champagne RN 10/15/19 -  Nurse          Wound 08/07/20 1250 Right lower lumbar spine Incision (Active)   Dressing Appearance dry;intact 8/9/2020  8:07 AM   Closure TIMOTHY 8/9/2020  8:07 AM   Base dressing in place, unable to visualize 8/9/2020  8:07 AM   Drainage Amount none 8/9/2020  8:07 AM   Care, Wound dressing removed 8/8/2020  4:45 PM   Dressing Care,  Wound gauze 8/9/2020  8:07 AM           Physical Therapy Education                 Title: PT OT SLP Therapies (In Progress)     Topic: Physical Therapy (Done)     Point: Mobility training (Done)     Description:   Instruct learner(s) on safety and technique for assisting patient out of bed, chair or wheelchair.  Instruct in the proper use of assistive devices, such as walker, crutches, cane or brace.              Patient Friendly Description:   It's important to get you on your feet again, but we need to do so in a way that is safe for you. Falling has serious consequences, and your personal safety is the most important thing of all.        When it's time to get out of bed, one of us or a family member will sit next to you on the bed to give you support.     If your doctor or nurse tells you to use a walker, crutches, a cane, or a brace, be sure you use it every time you get out of bed, even if you think you don't need it.    Learning Progress Summary           Patient Acceptance, E,TB,D, COLIN,KEITH,NR by WYATT at 8/8/2020 1746    Comment:  Education re: purpose of PT/importance of act; education re: brace mgmt to include libby/doffing; education for spinal precautions & safety/falls prevention; education for improved tech w/ bed mob, tfers & for proper use of rwx w/ standing gait activities                   Point: Body mechanics (Done)     Description:   Instruct learner(s) on proper positioning and spine alignment for patient and/or caregiver during mobility tasks and/or exercises.              Learning Progress Summary           Patient Acceptance, E,TB,D, COLIN,KEITH,NR by WYATT at 8/8/2020 1746    Comment:  Education re: purpose of PT/importance of act; education re: brace mgmt to include libby/doffing; education for spinal precautions & safety/falls prevention; education for improved tech w/ bed mob, tfers & for proper use of rwx w/ standing gait activities                   Point: Precautions (Done)     Description:   Instruct  learner(s) on prescribed precautions during mobility and gait tasks              Learning Progress Summary           Patient Acceptance, E,TB,D, VU,DU,NR by WYATT at 8/8/2020 4689    Comment:  Education re: purpose of PT/importance of act; education re: brace mgmt to include libby/doffing; education for spinal precautions & safety/falls prevention; education for improved tech w/ bed mob, tfers & for proper use of rwx w/ standing gait activities                               User Key     Initials Effective Dates Name Provider Type Discipline     08/02/18 -  Zoila Andrea, PT Physical Therapist PT                PT Recommendation and Plan     Plan of Care Reviewed With: patient  Progress: improving  Outcome Summary: Pt c/o back pain only when walking 8/10. Pt was mod x 1 assist with HOB up and using bed rail. Pt was min x 1 to stand and walked 30ft with RW minn x 1. Pt states if dc'd home he could sleep in his recliner. Pt would benefit from inpatient rehab if agrees to this.  Outcome Measures     Row Name 08/08/20 1800             How much help from another is currently needed...    Putting on and taking off regular lower body clothing?  2  -MM      Bathing (including washing, rinsing, and drying)  2  -MM      Toileting (which includes using toilet bed pan or urinal)  2  -MM      Putting on and taking off regular upper body clothing  3  -MM      Taking care of personal grooming (such as brushing teeth)  3  -MM      Eating meals  4  -MM      AM-PAC 6 Clicks Score (OT)  16  -MM         Functional Assessment    Outcome Measure Options  AM-PAC 6 Clicks Daily Activity (OT)  -MM        User Key  (r) = Recorded By, (t) = Taken By, (c) = Cosigned By    Initials Name Provider Type    MM Wayne Hook, OTR/L Occupational Therapist         Time Calculation:   PT Charges     Row Name 08/09/20 0948             Time Calculation    Start Time  0822  -AE      Stop Time  0847  -AE      Time Calculation (min)  25 min  -AE      PT  Received On  08/09/20  -AE      PT Goal Re-Cert Due Date  08/18/20  -AE         Time Calculation- PT    Total Timed Code Minutes- PT  25 minute(s)  -AE         Timed Charges    78625 - Gait Training Minutes   25  -AE        User Key  (r) = Recorded By, (t) = Taken By, (c) = Cosigned By    Initials Name Provider Type    AE Kelly Mack PTA Physical Therapy Assistant        Therapy Charges for Today     Code Description Service Date Service Provider Modifiers Qty    10184411299 HC GAIT TRAINING EA 15 MIN 8/9/2020 Kelly Mack PTA GP 2          PT G-Codes  Outcome Measure Options: AM-PAC 6 Clicks Daily Activity (OT)  AM-PAC 6 Clicks Score (PT): 14  AM-PAC 6 Clicks Score (OT): 16    Kelly Mack PTA  8/9/2020

## 2020-08-10 ENCOUNTER — HOSPITAL ENCOUNTER (INPATIENT)
Facility: HOSPITAL | Age: 75
LOS: 3 days | Discharge: NURSING, REHAB OR LONG-TERM CARE | End: 2020-08-13
Attending: ORTHOPAEDIC SURGERY | Admitting: ORTHOPAEDIC SURGERY

## 2020-08-10 ENCOUNTER — READMISSION MANAGEMENT (OUTPATIENT)
Dept: CALL CENTER | Facility: HOSPITAL | Age: 75
End: 2020-08-10

## 2020-08-10 DIAGNOSIS — Z78.9 DECREASED ACTIVITIES OF DAILY LIVING (ADL): ICD-10-CM

## 2020-08-10 DIAGNOSIS — M54.16 LUMBAR RADICULOPATHY: Primary | ICD-10-CM

## 2020-08-10 DIAGNOSIS — Z74.09 IMPAIRED FUNCTIONAL MOBILITY, BALANCE, GAIT, AND ENDURANCE: ICD-10-CM

## 2020-08-10 PROCEDURE — 25010000002 HYDROMORPHONE PER 4 MG: Performed by: ORTHOPAEDIC SURGERY

## 2020-08-10 RX ORDER — METOPROLOL SUCCINATE 100 MG/1
100 TABLET, EXTENDED RELEASE ORAL EVERY 12 HOURS SCHEDULED
Status: DISCONTINUED | OUTPATIENT
Start: 2020-08-10 | End: 2020-08-13 | Stop reason: HOSPADM

## 2020-08-10 RX ORDER — GLIPIZIDE 5 MG/1
5 TABLET ORAL DAILY
Status: DISCONTINUED | OUTPATIENT
Start: 2020-08-11 | End: 2020-08-13 | Stop reason: HOSPADM

## 2020-08-10 RX ORDER — OXYCODONE AND ACETAMINOPHEN 7.5; 325 MG/1; MG/1
1 TABLET ORAL EVERY 4 HOURS PRN
Status: DISCONTINUED | OUTPATIENT
Start: 2020-08-10 | End: 2020-08-13 | Stop reason: HOSPADM

## 2020-08-10 RX ORDER — ONDANSETRON 2 MG/ML
4 INJECTION INTRAMUSCULAR; INTRAVENOUS EVERY 6 HOURS PRN
Status: DISCONTINUED | OUTPATIENT
Start: 2020-08-10 | End: 2020-08-13 | Stop reason: HOSPADM

## 2020-08-10 RX ORDER — HYDROMORPHONE HYDROCHLORIDE 1 MG/ML
0.5 INJECTION, SOLUTION INTRAMUSCULAR; INTRAVENOUS; SUBCUTANEOUS
Status: DISCONTINUED | OUTPATIENT
Start: 2020-08-10 | End: 2020-08-13 | Stop reason: HOSPADM

## 2020-08-10 RX ORDER — ACETAMINOPHEN 325 MG/1
650 TABLET ORAL EVERY 6 HOURS PRN
Status: DISCONTINUED | OUTPATIENT
Start: 2020-08-10 | End: 2020-08-13 | Stop reason: HOSPADM

## 2020-08-10 RX ORDER — FUROSEMIDE 40 MG/1
40 TABLET ORAL
Status: DISCONTINUED | OUTPATIENT
Start: 2020-08-11 | End: 2020-08-13 | Stop reason: HOSPADM

## 2020-08-10 RX ADMIN — HYDROMORPHONE HYDROCHLORIDE 0.5 MG: 1 INJECTION, SOLUTION INTRAMUSCULAR; INTRAVENOUS; SUBCUTANEOUS at 20:35

## 2020-08-10 RX ADMIN — METOPROLOL SUCCINATE 100 MG: 100 TABLET, EXTENDED RELEASE ORAL at 23:07

## 2020-08-10 RX ADMIN — OXYCODONE HYDROCHLORIDE AND ACETAMINOPHEN 1 TABLET: 7.5; 325 TABLET ORAL at 23:07

## 2020-08-10 NOTE — OUTREACH NOTE
Prep Survey      Responses   [facility] facility patient discharged from?  [facility]   Is LACE score < 7 ?  No   Eligibility  Readm Mgmt   Discharge diagnosis  Lumbar radiculopathy [Status post right L3-4 hemilaminectomy decompression with complete facetectomy, neuroforaminotomy, discectomy,  TLIF L3-4, PSF with instrumentation L3-4, 8/7/2020]   COVID-19 Test Status  Negative   Does the patient have one of the following disease processes/diagnoses(primary or secondary)?  General Surgery   Does the patient have Home health ordered?  Yes   What is the Home health agency?   [facility]   Is there a DME ordered?  No   Comments regarding appointments  Per AVS   Prep survey completed?  Yes          Lianne Bocanegra RN

## 2020-08-10 NOTE — THERAPY DISCHARGE NOTE
Acute Care - Occupational Therapy Discharge Summary  [facility]     Patient Name: Shabbir Solroio  : 1945  MRN: [id number]    Today's Date: 8/10/2020  Onset of Illness/Injury or Date of Surgery: 20    Date of Referral to OT: 20  Referring Physician: Dr. Cloud      Admit Date: 2020        OT Recommendation and Plan    Visit Dx:    ICD-10-CM ICD-9-CM   1. Lumbar back pain M54.5 724.2   2. Gait abnormality R26.9 781.2   3. Impaired mobility and ADLs Z74.09 V49.89    Z78.9                Rehab Goal Summary     Row Name 08/10/20 0823             Dressing Goal 1 (OT)    Activity/Assistive Device (Dressing Goal 1, OT)  dressing skills, all  -MT      [place]/Cues Needed (Dressing Goal 1, OT)  conditional independence;set-up required;verbal cues required  -MT      Time Frame (Dressing Goal 1, OT)  10 days  -MT      Progress/Outcome (Dressing Goal 1, OT)  goal not met  -MT         Toileting Goal 1 (OT)    Activity/Device (Toileting Goal 1, OT)  toileting skills, all  -MT      [place] Level/Cues Needed (Toileting Goal 1, OT)  independent  -MT      Time Frame (Toileting Goal 1, OT)  10 days  -MT      Progress/Outcome (Toileting Goal 1, OT)  goal not met  -MT        User Key  (r) = Recorded By, (t) = Taken By, (c) = Cosigned By    Initials Name Provider Type Discipline    MT Sahara Guzman COTA/L Occupational Therapy Assistant OT          Outcome Measures     Row Name 20 1800             How much help from another is currently needed...    Putting on and taking off regular lower body clothing?  2  -MM      Bathing (including washing, rinsing, and drying)  2  -MM      Toileting (which includes using toilet bed pan or urinal)  2  -MM      Putting on and taking off regular upper body clothing  3  -MM      Taking care of personal grooming (such as brushing teeth)  3  -MM      Eating meals  4  -MM      AM-PAC 6 Clicks Score (OT)  16  -MM         Functional Assessment    Outcome Measure  Options  AM-PAC 6 Clicks Daily Activity (OT)  -MM        User Key  (r) = Recorded By, (t) = Taken By, (c) = Cosigned By    Initials Name Provider Type    Wayne Redmond, OTR/L Occupational Therapist          Therapy Suggested Charges     Code   Minutes Charges    None                 OT Discharge Summary  Reason for Discharge: Discharge from facility  Outcomes Achieved: Refer to plan of care for updates on goals achieved  Discharge Destination: Home with home health      MALIA Diaz  8/10/2020

## 2020-08-11 ENCOUNTER — READMISSION MANAGEMENT (OUTPATIENT)
Dept: CALL CENTER | Facility: HOSPITAL | Age: 75
End: 2020-08-11

## 2020-08-11 LAB
BACTERIA UR QL AUTO: ABNORMAL /HPF
BILIRUB UR QL STRIP: NEGATIVE
CLARITY UR: ABNORMAL
COLOR UR: YELLOW
GLUCOSE BLDC GLUCOMTR-MCNC: 108 MG/DL (ref 70–130)
GLUCOSE BLDC GLUCOMTR-MCNC: 112 MG/DL (ref 70–130)
GLUCOSE BLDC GLUCOMTR-MCNC: 130 MG/DL (ref 70–130)
GLUCOSE BLDC GLUCOMTR-MCNC: 144 MG/DL (ref 70–130)
GLUCOSE BLDC GLUCOMTR-MCNC: 157 MG/DL (ref 70–130)
GLUCOSE UR STRIP-MCNC: NEGATIVE MG/DL
HGB UR QL STRIP.AUTO: ABNORMAL
HYALINE CASTS UR QL AUTO: ABNORMAL /LPF
KETONES UR QL STRIP: ABNORMAL
LEUKOCYTE ESTERASE UR QL STRIP.AUTO: ABNORMAL
NITRITE UR QL STRIP: NEGATIVE
PH UR STRIP.AUTO: 6 [PH] (ref 5–8)
PROT UR QL STRIP: ABNORMAL
RBC # UR: ABNORMAL /HPF
REF LAB TEST METHOD: ABNORMAL
SP GR UR STRIP: >=1.03 (ref 1–1.03)
SQUAMOUS #/AREA URNS HPF: ABNORMAL /HPF
UROBILINOGEN UR QL STRIP: ABNORMAL
WBC UR QL AUTO: ABNORMAL /HPF

## 2020-08-11 PROCEDURE — 25010000002 HYDROMORPHONE PER 4 MG: Performed by: ORTHOPAEDIC SURGERY

## 2020-08-11 PROCEDURE — 81001 URINALYSIS AUTO W/SCOPE: CPT | Performed by: FAMILY MEDICINE

## 2020-08-11 PROCEDURE — 97162 PT EVAL MOD COMPLEX 30 MIN: CPT | Performed by: PHYSICAL THERAPIST

## 2020-08-11 PROCEDURE — 82962 GLUCOSE BLOOD TEST: CPT

## 2020-08-11 PROCEDURE — 97530 THERAPEUTIC ACTIVITIES: CPT

## 2020-08-11 PROCEDURE — 97110 THERAPEUTIC EXERCISES: CPT

## 2020-08-11 PROCEDURE — 97166 OT EVAL MOD COMPLEX 45 MIN: CPT | Performed by: OCCUPATIONAL THERAPIST

## 2020-08-11 RX ORDER — WARFARIN SODIUM 5 MG/1
5 TABLET ORAL
Status: DISCONTINUED | OUTPATIENT
Start: 2020-08-11 | End: 2020-08-12

## 2020-08-11 RX ORDER — NICOTINE POLACRILEX 4 MG
15 LOZENGE BUCCAL
Status: DISCONTINUED | OUTPATIENT
Start: 2020-08-11 | End: 2020-08-13 | Stop reason: HOSPADM

## 2020-08-11 RX ORDER — DEXTROSE MONOHYDRATE 25 G/50ML
25 INJECTION, SOLUTION INTRAVENOUS
Status: DISCONTINUED | OUTPATIENT
Start: 2020-08-11 | End: 2020-08-13 | Stop reason: HOSPADM

## 2020-08-11 RX ORDER — MAGNESIUM CARB/ALUMINUM HYDROX 105-160MG
296 TABLET,CHEWABLE ORAL DAILY PRN
Status: DISCONTINUED | OUTPATIENT
Start: 2020-08-11 | End: 2020-08-13 | Stop reason: HOSPADM

## 2020-08-11 RX ORDER — ATORVASTATIN CALCIUM 10 MG/1
10 TABLET, FILM COATED ORAL NIGHTLY
Status: DISCONTINUED | OUTPATIENT
Start: 2020-08-11 | End: 2020-08-13 | Stop reason: HOSPADM

## 2020-08-11 RX ORDER — METOPROLOL SUCCINATE 100 MG/1
100 TABLET, EXTENDED RELEASE ORAL 2 TIMES DAILY
Status: DISCONTINUED | OUTPATIENT
Start: 2020-08-11 | End: 2020-08-11 | Stop reason: SDUPTHER

## 2020-08-11 RX ORDER — GUAIFENESIN 600 MG/1
1200 TABLET, EXTENDED RELEASE ORAL DAILY
Status: DISCONTINUED | OUTPATIENT
Start: 2020-08-11 | End: 2020-08-13 | Stop reason: HOSPADM

## 2020-08-11 RX ORDER — POTASSIUM CHLORIDE 1.5 G/1.77G
20 POWDER, FOR SOLUTION ORAL DAILY
Status: DISCONTINUED | OUTPATIENT
Start: 2020-08-11 | End: 2020-08-13 | Stop reason: HOSPADM

## 2020-08-11 RX ORDER — OXYCODONE AND ACETAMINOPHEN 7.5; 325 MG/1; MG/1
1 TABLET ORAL EVERY 4 HOURS PRN
Status: DISCONTINUED | OUTPATIENT
Start: 2020-08-11 | End: 2020-08-13 | Stop reason: HOSPADM

## 2020-08-11 RX ORDER — ASPIRIN 81 MG/1
81 TABLET, CHEWABLE ORAL DAILY
Status: DISCONTINUED | OUTPATIENT
Start: 2020-08-11 | End: 2020-08-13 | Stop reason: HOSPADM

## 2020-08-11 RX ORDER — MONTELUKAST SODIUM 10 MG/1
10 TABLET ORAL NIGHTLY
Status: DISCONTINUED | OUTPATIENT
Start: 2020-08-11 | End: 2020-08-13 | Stop reason: HOSPADM

## 2020-08-11 RX ORDER — DOXYCYCLINE 100 MG/1
100 TABLET ORAL EVERY 12 HOURS SCHEDULED
Status: DISCONTINUED | OUTPATIENT
Start: 2020-08-11 | End: 2020-08-12

## 2020-08-11 RX ADMIN — MONTELUKAST 10 MG: 10 TABLET ORAL at 20:58

## 2020-08-11 RX ADMIN — METOPROLOL SUCCINATE 100 MG: 100 TABLET, EXTENDED RELEASE ORAL at 08:49

## 2020-08-11 RX ADMIN — DOXYCYCLINE 100 MG: 100 TABLET, FILM COATED ORAL at 08:49

## 2020-08-11 RX ADMIN — GUAIFENESIN 1200 MG: 600 TABLET, EXTENDED RELEASE ORAL at 08:49

## 2020-08-11 RX ADMIN — DOXYCYCLINE 100 MG: 100 TABLET, FILM COATED ORAL at 20:58

## 2020-08-11 RX ADMIN — LEVOTHYROXINE SODIUM 175 MCG: 150 TABLET ORAL at 08:49

## 2020-08-11 RX ADMIN — FUROSEMIDE 40 MG: 40 TABLET ORAL at 08:49

## 2020-08-11 RX ADMIN — HYDROMORPHONE HYDROCHLORIDE 0.5 MG: 1 INJECTION, SOLUTION INTRAMUSCULAR; INTRAVENOUS; SUBCUTANEOUS at 05:25

## 2020-08-11 RX ADMIN — WARFARIN SODIUM 5 MG: 5 TABLET ORAL at 17:46

## 2020-08-11 RX ADMIN — ATORVASTATIN CALCIUM 10 MG: 10 TABLET, FILM COATED ORAL at 20:58

## 2020-08-11 RX ADMIN — FUROSEMIDE 40 MG: 40 TABLET ORAL at 17:46

## 2020-08-11 RX ADMIN — OXYCODONE HYDROCHLORIDE AND ACETAMINOPHEN 1 TABLET: 7.5; 325 TABLET ORAL at 20:59

## 2020-08-11 RX ADMIN — OXYCODONE HYDROCHLORIDE AND ACETAMINOPHEN 1 TABLET: 7.5; 325 TABLET ORAL at 13:15

## 2020-08-11 RX ADMIN — GLIPIZIDE 5 MG: 5 TABLET ORAL at 08:49

## 2020-08-11 RX ADMIN — OXYCODONE HYDROCHLORIDE AND ACETAMINOPHEN 1 TABLET: 7.5; 325 TABLET ORAL at 07:32

## 2020-08-11 RX ADMIN — POTASSIUM CHLORIDE 20 MEQ: 1.5 POWDER, FOR SOLUTION ORAL at 08:48

## 2020-08-11 RX ADMIN — POLYETHYLENE GLYCOL (3350) 17 G: 17 POWDER, FOR SOLUTION ORAL at 08:49

## 2020-08-11 RX ADMIN — METOPROLOL SUCCINATE 100 MG: 100 TABLET, EXTENDED RELEASE ORAL at 20:58

## 2020-08-11 RX ADMIN — POLYETHYLENE GLYCOL (3350) 17 G: 17 POWDER, FOR SOLUTION ORAL at 20:59

## 2020-08-11 RX ADMIN — ASPIRIN 81 MG 81 MG: 81 TABLET ORAL at 08:51

## 2020-08-11 NOTE — PLAN OF CARE
Problem: Patient Care Overview  Goal: Plan of Care Review  Outcome: Ongoing (interventions implemented as appropriate)  Flowsheets  Taken 8/11/2020 0758 by Yancy Gordon, OTR/L  Progress: no change  Taken 8/11/2020 0851 by Andressa Castro, RN  Plan of Care Reviewed With: patient;spouse  Taken 8/11/2020 0740 by Sharon Fay, PT, DPT, NCS  Outcome Summary: PT evaluation completed. The patient presents alert and oriented x4. He presents with pain radiating down his R LE at rest in bed and with all mobility. He reported no change in his pain with mobility. He does have R hip flexion and R quad weakness that seems to be about the same as noted by the PT after surgery. He requires assist to reach full extension of his R knee in standing and blocking of his R knee to prevent buckling. He was able to transfer to a chair with 2 people assisting him. He will benefit from skilled PT services to work on strengthening, functional mobility, and pain control during mobility. Recommend discharge to inpt acute rehab.

## 2020-08-11 NOTE — THERAPY TREATMENT NOTE
Acute Care - Physical Therapy Treatment Note  [facility]     Patient Name: Shabbir Solorio  : 1945  MRN: [id number]  Today's Date: 2020  Onset of Illness/Injury or Date of Surgery: 08/10/20     Referring Physician: Dr. Cloud     Admit Date: 8/10/2020    Visit Dx:    ICD-10-CM ICD-9-CM   1. Decreased activities of daily living (ADL) Z78.9 V49.89   2. Impaired functional mobility, balance, gait, and endurance Z74.09 V49.89     Patient Active Problem List   Diagnosis   • Lumbar radiculopathy   • DDD (degenerative disc disease), lumbar   • Lumbar back pain   • Chronic atrial fibrillation (CMS/HCC)   • Long term current use of anticoagulant therapy   • Nonischemic cardiomyopathy (CMS/HCC)   • KEYONA (obstructive sleep apnea)   • S/P AVR (aortic valve replacement)   • LV dysfunction   • Type 2 diabetes mellitus (CMS/HCC)   • Essential hypertension   • Hyperlipidemia   • CKD (chronic kidney disease) stage 3, GFR 30-59 ml/min (CMS/[facility])   • Hypothyroidism (acquired)       Therapy Treatment    Rehabilitation Treatment Summary     Row Name 20 1352             Treatment Time/Intention    Discipline  physical therapy assistant  -KJ      Document Type  therapy note (daily note)  -KJ      Subjective Information  complains of;pain  -KJ2      Mode of Treatment  physical therapy  -KJ2      Existing Precautions/Restrictions  fall;spinal  -KJ2      Treatment Considerations/Comments  LSO  -KJ2      Recorded by [KJ] Rama Babcock, PTA 20 1407  [KJ2] Rama Babcock, PTA 20 1440      Row Name 20 1352             Bed Mobility Assessment/Treatment    Sit-Sidelying [place] (Bed Mobility)  moderate assist (50% patient effort);2 person assist  -KJ      Assistive Device (Bed Mobility)  bed rails  -KJ      Recorded by [KJ] Rama Babcock, PTA 20 1440      Row Name 20 1352             Sit-Stand Transfer    Sit-Stand [place] (Transfers)  verbal cues;minimum assist (75% patient effort);2  person assist  -KJ      Assistive Device (Sit-Stand Transfers)  walker, front-wheeled  -KJ      Recorded by [KJ] Rama Babcock, hospitals 08/11/20 1440      Row Name 08/11/20 1352             Stand-Sit Transfer    Stand-Sit [place] (Transfers)  verbal cues;minimum assist (75% patient effort)  -KJ      Recorded by [KJ] Rama Babcock, hospitals 08/11/20 1440      Row Name 08/11/20 1352             Gait/Stairs Assessment/Training    [place] Level (Gait)  verbal cues;minimum assist (75% patient effort);2 person assist 2 for safety due to R leg buckling  -KJ      Assistive Device (Gait)  walker, front-wheeled  -KJ      Distance in Feet (Gait)  steps from chair to bed; stood x 5  -KJ      Pattern (Gait)  step-to  -KJ      Deviations/Abnormal Patterns (Gait)  right sided deviations  -KJ      Bilateral Gait Deviations  forward flexed posture  -KJ      Recorded by [KJ] Rama Babcock hospitals 08/11/20 1440      Row Name 08/11/20 1352             Motor Skills Assessment/Interventions    Additional Documentation  Therapeutic Exercise (Group)  -KJ      Recorded by [KJ] Rama Babcock hospitals 08/11/20 1440      Row Name 08/11/20 1352             Therapeutic Exercise    Exercise Type (Therapeutic Exercise)  AROM (active range of motion)  -KJ      Position (Therapeutic Exercise)  supine  -KJ      Sets/Reps (Therapeutic Exercise)  10  -KJ      Recorded by [KJ] Rama Babcock, hospitals 08/11/20 1440      Row Name 08/11/20 1352             Positioning and Restraints    Pre-Treatment Position  sitting in chair/recliner  -KJ      Post Treatment Position  bed  -KJ      Recorded by [KJ] Rama Babcock, hospitals 08/11/20 1440      Row Name 08/11/20 1352             Pain Scale: Numbers Pre/Post-Treatment    Pain Scale: Numbers, Pretreatment  6/10  -KJ      Pain Scale: Numbers, Post-Treatment  6/10  -KJ      Pain Location - Side  Right  -KJ      Pain Location  hip  -KJ      Recorded by [KJ] Rama Babcock hospitals 08/11/20 1440      Row Name 08/11/20  1352             Spinal Orthosis Management    Type (Spinal Orthosis)  LSO (lumbar sacral orthosis)  -KJ      Wearing Schedule (Spinal Orthosis)  wear when out of bed only  -KJ      Recorded by [KJ] Rama Babcock, PTA 08/11/20 1440        User Key  (r) = Recorded By, (t) = Taken By, (c) = Cosigned By    Initials Name Effective Dates Discipline    RAVINDRA Rama Babcock, PTA 08/02/16 -  PT               Rehab Goal Summary     Row Name 08/11/20 0758 08/11/20 0759          Bed Mobility Goal 1 (PT)    Activity/Assistive Device (Bed Mobility Goal 1, PT)  --  rolling to left;rolling to right;sidelying to sit;sit to sidelying;bed rails  -MS     [place] Level/Cues Needed (Bed Mobility Goal 1, PT)  --  minimum assist (75% or more patient effort);tactile cues required;verbal cues required  -MS     Time Frame (Bed Mobility Goal 1, PT)  --  long term goal (LTG);by discharge  -MS     Progress/Outcomes (Bed Mobility Goal 1, PT)  --  goal ongoing  -MS        Transfer Goal 1 (PT)    Activity/Assistive Device (Transfer Goal 1, PT)  --  sit-to-stand/stand-to-sit;bed-to-chair/chair-to-bed;walker, rolling  -MS     [place] Level/Cues Needed (Transfer Goal 1, PT)  --  minimum assist (75% or more patient effort);tactile cues required;verbal cues required  -MS     Time Frame (Transfer Goal 1, PT)  --  long term goal (LTG);by discharge  -MS     Progress/Outcome (Transfer Goal 1, PT)  --  goal ongoing  -MS        Gait Training Goal 1 (PT)    Activity/Assistive Device (Gait Training Goal 1, PT)  --  gait (walking locomotion);assistive device use;decrease fall risk;increase endurance/gait distance;walker, rolling  -MS     [place] Level (Gait Training Goal 1, PT)  --  minimum assist (75% or more patient effort);tactile cues required;verbal cues required  -MS     Distance (Gait Goal 1, PT)  --  25ft with pt reaching R knee ext and no R knee buckling  -MS     Time Frame (Gait Training Goal 1, PT)  --  long term goal (LTG);by  discharge  -MS     Progress/Outcome (Gait Training Goal 1, PT)  --  goal ongoing  -MS        ROM Goal 1 (PT)    ROM Goal 1 (PT)  --  R knee ext to reach 0 degrees against gravity  -MS     Time Frame (ROM Goal 1, PT)  --  long term goal (LTG);by discharge  -MS     Progress/Outcome (ROM Goal 1, PT)  --  goal ongoing  -MS        Occupational Therapy Goals    Transfer Goal Selection (OT)  transfer, OT goal 1  -CH  --     Dressing Goal Selection (OT)  dressing, OT goal 1  -CH  --     Toileting Goal Selection (OT)  toileting, OT goal 1  -CH  --        Transfer Goal 1 (OT)    Activity/Assistive Device (Transfer Goal 1, OT)  sit-to-stand/stand-to-sit;bed-to-chair/chair-to-bed;commode;walker, rolling  -CH  --     [place] Level/Cues Needed (Transfer Goal 1, OT)  minimum assist (75% or more patient effort)  -CH  --     Time Frame (Transfer Goal 1, OT)  long term goal (LTG);by discharge  -CH  --     Barriers (Transfers Goal 1, OT)  .  -CH  --     Progress/Outcome (Transfer Goal 1, OT)  goal ongoing  -CH  --        Dressing Goal 1 (OT)    Activity/Assistive Device (Dressing Goal 1, OT)  upper body dressing LSO  -CH  --     [place]/Cues Needed (Dressing Goal 1, OT)  supervision required;verbal cues required  -CH  --     Time Frame (Dressing Goal 1, OT)  long term goal (LTG);by discharge  -CH  --     Barriers (Dressing Goal 1, OT)  .  -CH  --     Progress/Outcome (Dressing Goal 1, OT)  goal ongoing  -CH  --        Toileting Goal 1 (OT)    Activity/Device (Toileting Goal 1, OT)  toileting skills, all;adjust/manage clothing;perform perineal hygiene;commode, 3-in-1;commode;grab bar/safety frame  -CH  --     [place] Level/Cues Needed (Toileting Goal 1, OT)  moderate assist (50-74% patient effort);verbal cues required  -CH  --     Time Frame (Toileting Goal 1, OT)  long term goal (LTG);by discharge  -CH  --     Barriers (Toileting Goal 1, OT)  .  -CH  --     Progress/Outcome (Toileting Goal 1, OT)  goal ongoing  -CH  --        User Key  (r) = Recorded By, (t) = Taken By, (c) = Cosigned By    Initials Name Provider Type Discipline    CH Yancy Gordon, OTR/L Occupational Therapist OT    Sharon Carranza, PT, DPT, NCS Physical Therapist PT          Physical Therapy Education                 Title: PT OT SLP Therapies (In Progress)     Topic: Physical Therapy (In Progress)     Point: Mobility training (In Progress)     Description:   Instruct learner(s) on safety and technique for assisting patient out of bed, chair or wheelchair.  Instruct in the proper use of assistive devices, such as walker, crutches, cane or brace.              Patient Friendly Description:   It's important to get you on your feet again, but we need to do so in a way that is safe for you. Falling has serious consequences, and your personal safety is the most important thing of all.        When it's time to get out of bed, one of us or a family member will sit next to you on the bed to give you support.     If your doctor or nurse tells you to use a walker, crutches, a cane, or a brace, be sure you use it every time you get out of bed, even if you think you don't need it.    Learning Progress Summary           Patient Acceptance, E, NR by MS at 8/11/2020 1054    Comment:  role of PT in his care, safety for R knee buckling                   Point: Home exercise program (Not Started)     Description:   Instruct learner(s) on appropriate technique for monitoring, assisting and/or progressing patient with therapeutic exercises and activities.              Learner Progress:   Not documented in this visit.          Point: Body mechanics (Not Started)     Description:   Instruct learner(s) on proper positioning and spine alignment for patient and/or caregiver during mobility tasks and/or exercises.              Learner Progress:   Not documented in this visit.          Point: Precautions (Not Started)     Description:   Instruct learner(s) on prescribed precautions  during mobility and gait tasks              Learner Progress:   Not documented in this visit.                      User Key     Initials Effective Dates Name Provider Type Discipline    MS 06/19/18 -  Sharon Fay, PT, DPT, NCS Physical Therapist PT                PT Recommendation and Plan        Outcome Measures     Row Name 08/11/20 0758             How much help from another is currently needed...    Putting on and taking off regular lower body clothing?  2  -CH      Bathing (including washing, rinsing, and drying)  2  -CH      Toileting (which includes using toilet bed pan or urinal)  2  -CH      Putting on and taking off regular upper body clothing  3  -CH      Taking care of personal grooming (such as brushing teeth)  4  -CH      Eating meals  4  -CH      AM-PAC 6 Clicks Score (OT)  17  -CH         Functional Assessment    Outcome Measure Options  AM-PAC 6 Clicks Daily Activity (OT)  -CH        User Key  (r) = Recorded By, (t) = Taken By, (c) = Cosigned By    Initials Name Provider Type     Yancy Gordon, OTR/L Occupational Therapist         Time Calculation:   PT Charges     Row Name 08/11/20 1440 08/11/20 0740          Time Calculation    Start Time  1352  -KJ  0740 10 min chart review  -MS     Stop Time  1430  -KJ  0830  -MS     Time Calculation (min)  38 min  -KJ  50 min  -MS     PT Received On  --  08/11/20  -MS     PT Goal Re-Cert Due Date  --  08/21/20  -MS        Time Calculation- PT    Total Timed Code Minutes- PT  38 minute(s)  -KJ  --       User Key  (r) = Recorded By, (t) = Taken By, (c) = Cosigned By    Initials Name Provider Type    Rama Wynn, JAREN Physical Therapy Assistant    MS Sharon Fay, PT, DPT, NCS Physical Therapist        Therapy Charges for Today     Code Description Service Date Service Provider Modifiers Qty    15459566839 HC PT THER PROC EA 15 MIN 8/11/2020 Rama Babcock, JAREN GP 1    31922396967 HC PT THERAPEUTIC ACT EA 15 MIN 8/11/2020 Rama Babcock, JAREN GP  2          PT G-Codes  Outcome Measure Options: AM-PAC 6 Clicks Daily Activity (OT)  AM-PAC 6 Clicks Score (PT): 8  AM-PAC 6 Clicks Score (OT): 17    Rama Babcock, PTA  8/11/2020

## 2020-08-11 NOTE — PROGRESS NOTES
Orthopedic Spine Progress Note    Shabbir Solorio  75 y.o.  male      1. Back pain.  2. Severe right anterior thigh and leg radiculopathy.  3. Multilevel lumbar degenerative disk disease.  4. Multilevel lumbar facet arthropathy.  5. Large foraminal disk herniation , right L3-4.  6. Severe central and right-sided foraminal stenosis, L3-4.  7. Atrial fibrillation, on Coumadin.  8. History of bladder cancer.  9.  Status post right L3-4 hemilaminectomy decompression with complete facetectomy, neuroforaminotomy, discectomy,  TLIF L3-4, PSF with instrumentation L3-4, 8/7/2020       Systemic or Specific Complaints:     Patient discharged over the weekend after the above mentioned procedure. Patient had increased pain and weakness, specifically in the right lower extremity. Patient was eventually admitted to an outlying hospital, then transferred to [facility] for definitive care.   Objective     Vital signs in last 24 hours:  Temp:  [98 °F (36.7 °C)-98.8 °F (37.1 °C)] 98 °F (36.7 °C)  Heart Rate:  [] 97  Resp:  [18] 18  BP: (129-135)/(73-81) 135/77    Physical Exam:    A& X3, NAD  Incision CDI  B. LE NVI    Diagnostic Data:  CBC:  Results from last 7 days   Lab Units 08/09/20  0413   WBC 10*3/mm3 13.74*   RBC 10*6/mm3 3.55*   HEMOGLOBIN g/dL 10.9*   HEMATOCRIT % 33.6*   PLATELETS 10*3/mm3 169          Assessment/Plan     1.Status post right L3-4 hemilaminectomy decompression with complete facetectomy, neuroforaminotomy, discectomy,  TLIF L3-4, PSF with instrumentation L3-4, 8/7/2020    Plan:  1. Consult PT/OT  2. Plan to d/c to outpatient rehab  3. Pain Control  4. Brace       RAMANA Monsalve    Date: 8/11/2020  Time: 07:12

## 2020-08-11 NOTE — OUTREACH NOTE
General Surgery Week 1 Survey      Responses   [facility] patient discharged from?  [place]   Does the patient have one of the following disease processes/diagnoses(primary or secondary)?  General Surgery   Is there a successful TCM telephone encounter documented?  No   Week 1 attempt successful?  No   Revoke  Readmitted          Linda Jacome RN

## 2020-08-11 NOTE — DISCHARGE PLACEMENT REQUEST
"Darleen Lugo [phone]  OsminShabbir thomson (75 y.o. Male)     Date of Birth Social Security Number Address Home Phone MRN    1945  [address] [facility] [id number] [phone] [id number]    [employer] Marital Status          Other        Admission Date Admission Type Admitting Provider Attending Provider Department, Room/Bed    8/10/20 Urgent MIKI Cloud MD Strenge, K Brandon, MD [facility] 3A, 336/1    Discharge Date Discharge Disposition Discharge Destination                       Attending Provider:  MIKI Cloud MD    Allergies:  Advair Diskus [Fluticasone-salmeterol], Penicillins    Isolation:  None   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)   Code Status:  CPR    Ht:  210.8 cm (83\")   Wt:  141 kg (310 lb)    Admission Cmt:  None   Principal Problem:  None                Active Insurance as of 8/10/2020     Primary Coverage     Payor Plan Insurance Group Employer/Plan Group    MEDICARE MEDICARE A & B      Payor Plan Address Payor Plan Phone Number Payor Plan Fax Number Effective Dates    [address] [phone]  7/1/2011 - None Entered    [facility] [id number]       Subscriber Name Subscriber Birth Date Member ID       SHABBIR WINTER 1945 [id number]           Secondary Coverage     Payor Plan Insurance Group Employer/Plan Group    [facility] [id number]     Payor Plan Address Payor Plan Phone Number Payor Plan Fax Number Effective Dates    [address]   7/1/2011 - None Entered    [facility] [id number]       Subscriber Name Subscriber Birth Date Member ID       SHABBIR WINTER 1945 [id number]                 Emergency Contacts      (Rel.) Home Phone Work Phone Mobile Phone    ISMAEL WINTER (Spouse) -- -- [phone]              Vital Signs (last 2 days)     Date/Time   Temp   Temp src   Pulse   Resp   BP   Patient Position   SpO2    08/11/20 1201   98.8 (37.1)   Oral   88   16   121/91   Sitting   94    08/11/20 0840   " 98.3 (36.8)   Oral   105   16   126/78   Sitting   96    08/11/20 0529   98 (36.7)   Oral   97   18   135/77   Lying   95    08/11/20 0041   98.5 (36.9)   Oral   114   18   133/73   Lying   94    08/10/20 2039   --   --   101   --   --   --   95    08/10/20 1927   98.8 (37.1)   Oral   99   18   129/81   Lying   98          COVID PRE-OP / PRE-PROCEDURE SCREENING ORDER (NO ISOLATION) - Swab, Oropharynx [[id number]] (Order [id number])   Order   Date: 8/3/2020 Department: [facility] PATIENT ACCESS Ordering/Authorizing: MIKI Cloud MD   Linked Results     Procedure Abnormality Status   COVID-19,[facility] ROUTINE, NP/OP SWAB IN TRANSPORT MEDIA OR ESWAB 72 HR TAT - Swab, Oropharynx  Final result   COVID [facility] Priority - Swab, Oropharynx  Final result   Reprint Order Requisition     COVID PRE-OP / PRE-PROCEDURE SCREENING ORDER (NO ISOLATION) - Swab, Oropharynx (Order #[id number]) on 8/3/20       COVID PRE-OP / PRE-PROCEDURE SCREENING ORDER (NO ISOLATION) - Swab, Oropharynx   Order: [id number]   Status:  Final result   Visible to patient:  No (Not Released) Next appt:  None Dx:  Pre-op testing   Specimen Information: Oropharynx; Swab              Specimen Collected: 08/04/20 10:30 Last Resulted: 08/05/20 21:06 Order Details View Encounter Lab and Collection Details Routing Result History            COVID-19,[facility] ROUTINE, NP/OP SWAB IN TRANSPORT MEDIA OR ESWAB 72 HR TAT - Swab, Oropharynx   Order: [id number] - Part of Panel Order [id number]   Status:  Final result   Visible to patient:  No (Not Released) Next appt:  None Dx:  Pre-op testing   Specimen Information: Oropharynx; Swab        Component  Ref Range & Units    SARS-CoV-2, BELLA  Not Detected Not Detected    Comment: This test was developed and its performance characteristics determined   by [employer]. This test has not been FDA cleared or   approved. This test has been authorized by FDA under an Emergency Use   Authorization (EUA). This test  is only authorized for the duration of   time the declaration that circumstances exist justifying the   authorization of the emergency use of in vitro diagnostic tests for   detection of SARS-CoV-2 virus and/or diagnosis of COVID-19 infection   under section 564(b)(1) of the Act, 21 U.S.C. 360bbb-3(b)(1), unless   the authorization is terminated or revoked sooner.   When diagnostic testing is negative, the possibility of a false   negative result should be considered in the context of a patient's   recent exposures and the presence of clinical signs and symptoms   consistent with COVID-19. An individual without symptoms of COVID-19   and who is not shedding SARS-CoV-2 virus would expect to have a   negative (not detected) result in this assay.   Resulting Agency [facility]      Narrative   Performed by: [facility]   Performed at:  [address] [place] [address] [facility]  [facility] [employer] [facility] [place]  [id number]  : Stephania Nuñez MD, Phone:  [phone]      Specimen Collected: 08/04/20 10:30 Last Resulted: 08/05/20 21:06 Order Details View Encounter Lab and Collection Details Routing Result History            COVID [facility] Priority - Swab, Oropharynx   Order: [id number] - Part of Panel Order [id number]   Status:  Final result   Visible to patient:  No (Not Released) Next appt:  None Dx:  Pre-op testing   Specimen Information: Oropharynx; Swab        Component    COVID [facility] PRIORITY Comment     Comment: Received   Resulting Agency [facility]      Narrative   Performed by: [facility]   Performed at:  [address] [place] [address] [facility]  [facility] [place]  [id number]  : Stephania Nuñez MD, Phone:  [phone]      Specimen Collected: 08/04/20 10:30 Last Resulted: 08/05/20 21:06 Order Details View Encounter Lab and Collection Details Routing Result History            Result Read / Acknowledged      Acknowledge result   COVID-19,[facility] ROUTINE, NP/OP SWAB IN TRANSPORT MEDIA OR ESWAB 72 HR TAT - Swab,  Oropharynx (Order [id number])     No acknowledgement history exists for this order.      COVID [facility] Priority - Swab, Oropharynx (Order [id number])

## 2020-08-11 NOTE — PROGRESS NOTES
Continued Stay Note   [place]     Patient Name: Shabbir Solorio  MRN: [id number]  Today's Date: 8/11/2020    Admit Date: 8/10/2020    Discharge Plan     Row Name 08/11/20 1333       Plan    Plan  [facility]- bed available Thursday 8/13    Patient/Family in Agreement with Plan  yes    Plan Comments  Spoke with Sena from [facility] and they will be able to accept pt at facility on Thursday 8/13. Will follow. [phone]        Discharge Codes    No documentation.             ADOLFO Zamudio

## 2020-08-11 NOTE — PLAN OF CARE
Pt. c/o burning incisional pain that radiates down RLE, PRN Norco and Dilaudid given w/ some relief. Incision is cdi. Vitals stable. Tachycardic. On 2L nasal cannula. ARREGUIN. RLE weakness noted. C/o n/t RLE, bilateral feet. Voiding per urinal.   Problem: Patient Care Overview  Goal: Plan of Care Review  Outcome: Ongoing (interventions implemented as appropriate)  Flowsheets (Taken 8/11/2020 7241)  Progress: no change  Plan of Care Reviewed With: patient

## 2020-08-11 NOTE — PLAN OF CARE
Problem: Patient Care Overview  Goal: Plan of Care Review  Outcome: Ongoing (interventions implemented as appropriate)  Flowsheets (Taken 8/11/2020 8060)  Plan of Care Reviewed With: patient;spouse   A&Ox4. C/o lower back pain. Pain meds given with good relief. C/o RLE numbness; pt states this has been baseline for him. Wife helps patient get cleaned up today. Lift team to assist when transferring and ambulating. Held metformin this morning. No insulin required. U/A showed trace amounts of bacteria. VSS.

## 2020-08-11 NOTE — CONSULTS
Referring Provider: Dr. Cloud  Reason for Consultation: Medical management    Patient Care Team:  Pilo Costa MD as PCP - General (Family Medicine)    Chief complaint chronic low back pain    Subjective .     History of present illness:  The patient presents today after surgical correction last week.  They have been through anti-inflammatories, muscle relaxers, and pain medication.  The pain has progressed to the point in time where it is affecting their activities of daily living since his recent surgery.  Postoperative pain is as expected.  There are no other precipitating or relieving factors. I have been requested by the Attending Physician to provide Medical Consultation in the perioperative period. The patient understands my role in their hospitalization and agrees with my treatment plan. They understand the importance of follow up with their PCP upon discharge  from [facility] for any concerns or abnormalities.      REVIEW OF SYSTEMS:    CONSTITUTIONAL:  Negative for anorexia, chills, fevers, night sweats and weight loss  EYES:  negative for eye dryness, icterus and redness  HEENT:   negative for dental problems, epistaxis, facial trauma and thrush  RESPIRATORY:  negative for chest tightness, cough, dyspnea on exertion, pneumonia and sputum  CARDIOVASCULAR: negative for chest pain, dyspnea, exertional chest pressure/discomfort, irregular heart beat, palpitations, paroxysmal nocturnal dyspnea and syncope  GASTROINTESTINAL:  negative for abdominal pain, hematemesis, jaundice, melena and rectal bleeding. No significant changes in bowel habits preoperatively.   MUSCULOSKELETAL:  negative for muscle weakness, myalgias and neck pain, outside of surgical issues noted above  NEUROLOGICAL:   negative for dizziness, headaches, seizures, speech problems, tremors and vertigo  INTEGUMENT: negative for pruritus, rash, skin color change and skin lesion(s)         History    Past Medical History:   Diagnosis  Date   • Arthritis    • Atrial fibrillation (CMS/HCC)    • Back pain    • Cancer (CMS/HCC)     bladder   • DDD (degenerative disc disease), lumbar 8/7/2020   • Diabetes mellitus (CMS/HCC)    • Disease of thyroid gland    • Function kidney decreased    • Heart valve replaced    • Hyperlipidemia    • Hypertension    • Lumbar back pain 8/7/2020   • Lumbar radiculopathy 8/7/2020   • Neuropathy    • Sleep apnea      Past Surgical History:   Procedure Laterality Date   • CARDIAC VALVE REPLACEMENT     • CYSTOSCOPY BLADDER BIOPSY     • LUMBAR LAMINECTOMY WITH FUSION Right 8/7/2020    Procedure: RIGHT L3-4, HEMILAMINECTOMY, FACETECTOMY, DISCECTOMY, TRANSFORAMINAL LUMBAR INTERBODY FUSION, POSTERIOR SPINAL FUSION WITH INSTRUMENTATION.;  Surgeon: MIKI Cloud MD;  Location: [facility];  Service: Orthopedic Spine;  Laterality: Right;   • VASECTOMY       No family history on file.  Social History     Tobacco Use   • Smoking status: Former Smoker   • Smokeless tobacco: Never Used   • Tobacco comment: quit 40 years ago   Substance Use Topics   • Alcohol use: Never     Frequency: Never   • Drug use: Never     Medications Prior to Admission   Medication Sig Dispense Refill Last Dose   • aspirin 81 MG chewable tablet Chew 81 mg Daily.   8/7/2020 at Unknown time   • atorvastatin (LIPITOR) 10 MG tablet Take 10 mg by mouth Daily.   8/7/2020 at 2000   • furosemide (LASIX) 40 MG tablet Take 40 mg by mouth 2 (Two) Times a Day.   8/7/2020 at 2000   • glipizide (GLUCOTROL) 5 MG tablet Take 5 mg by mouth Daily.   8/7/2020 at 0800   • guaiFENesin (Mucinex) 600 MG 12 hr tablet Take 1,200 mg by mouth Daily.   8/7/2020 at Unknown time   • levothyroxine (SYNTHROID, LEVOTHROID) 175 MCG tablet Take 175 mcg by mouth Daily.   8/7/2020 at 1000   • metFORMIN (GLUCOPHAGE) 1000 MG tablet Take 1,000 mg by mouth 2 (Two) Times a Day With Meals.   8/7/2020 at 2000   • metoprolol succinate XL (TOPROL-XL) 100 MG 24 hr tablet Take 100 mg by mouth 2 (two)  times a day.   8/7/2020 at 0645   • montelukast (SINGULAIR) 10 MG tablet Take 10 mg by mouth Every Night.   8/7/2020 at 2000   • Omega-3 Fatty Acids (FISH OIL) 1200 MG capsule capsule Take 1,200 mg by mouth Daily.   8/7/2020 at 2000   • oxyCODONE-acetaminophen (PERCOCET) 7.5-325 MG per tablet Take 1 tablet by mouth Every 4 (Four) Hours As Needed for Moderate Pain  for up to 9 days.      • potassium chloride (KLOR-CON) 20 MEQ packet Take 20 mEq by mouth Daily.      • vitamin C (ASCORBIC ACID) 500 MG tablet Take 500 mg by mouth Daily.   8/7/2020 at 0800   • vitamin D (ERGOCALCIFEROL) 1.25 MG (56411 UT) capsule capsule Take 50,000 Units by mouth 1 (One) Time Per Week.   8/7/2020 at Unknown time   • warfarin (COUMADIN) 5 MG tablet Take 5 mg by mouth Daily.   8/8/20 at 1710       Allergies:  Advair diskus [fluticasone-salmeterol] and Penicillins    Objective     Vital Signs   Temp:  [98 °F (36.7 °C)-98.8 °F (37.1 °C)] 98 °F (36.7 °C)  Heart Rate:  [] 97  Resp:  [18] 18  BP: (129-135)/(73-81) 135/77          Physical Exam:  Constitutional: oriented to person, place, and time. appears well-developed.   HEENT:   Head: Normocephalic and atraumatic.   Eyes: Pupils are equal, round, and reactive to light.   Neck: Neck supple.   Cardiovascular: Chemical valve noted  Pulmonary/Chest: Effort normal and breath sounds normal. CTAB  Abdominal: Soft. Bowel sounds are normal to hypoactive. No significant distension. There is no tenderness. There is no rebound and no guarding.   Musculoskeletal: Normal range of motion and no edema or tenderness outside of surgical area.   Neurological: Pt is alert and oriented to person, place, and time.  normal reflexes present.   Skin: Skin is warm and dry.     Results Review:   I reviewed the patient's new imaging results and agree with the interpretation.      Assessment/Plan       Lumbar radiculopathy      Type 2 DM- review labs and home medication. Discuss with patient importance of blood  sugar control in the healing process. Review diet, medical,and lifestyle modifications for optimal medical treatment. Will restart their regular diabetic regimen and make consult for diabetic education / dietician available upon request.  Will hold his Glucophage since he recently had contrast and will place him on Accu-Cheks sliding scale insulin.    History of UTI/MRSA on previous urine- repeat urine place him on doxycycline 100 mg twice daily.    Constipation-start with Miralax 1 capful BID until BM, then decrease to 1 x a day,then can step up to Amitiza 24 mcg po BID which can be used for opioid induced constipation,and ultimately end up with Relistor 12 mcg subq every 48 hours to block the effect of narcotics on the gut.      Hypertension-review pre and post BP's,will restart home BP meds when BP allows. Recent studies demonstrate the need for patience and less reactivity for precipitous drop of BP in the acute care setting. Will educate staff to use other modalities to help reduce MAP prior to adding additional medical interventions. Add clonidine 0.1 mg every 4 hours prn if bp> 140/90,monitor BP and adjust meds as necessary.      Reviewed work-up at [place].  Place him back on his Coumadin dose with goal INR of 2-3.    I discussed the patient's findings and my recommendations with patient, nursing staff and consulting provider    Howard Aguilar MD  08/11/20  07:42

## 2020-08-11 NOTE — THERAPY EVALUATION
Acute Care - Occupational Therapy Initial Evaluation  [facility]     Patient Name: Shabbir Solorio  : 1945  MRN: [id number]  Today's Date: 2020  Onset of Illness/Injury or Date of Surgery: 08/10/20  Date of Referral to OT: 08/10/20  Referring Physician: Dr. Cloud     Admit Date: 8/10/2020       ICD-10-CM ICD-9-CM   1. Decreased activities of daily living (ADL) Z78.9 V49.89     Patient Active Problem List   Diagnosis   • Lumbar radiculopathy   • DDD (degenerative disc disease), lumbar   • Lumbar back pain   • Chronic atrial fibrillation (CMS/HCC)   • Long term current use of anticoagulant therapy   • Nonischemic cardiomyopathy (CMS/HCC)   • KEYONA (obstructive sleep apnea)   • S/P AVR (aortic valve replacement)   • LV dysfunction   • Type 2 diabetes mellitus (CMS/HCC)   • Essential hypertension   • Hyperlipidemia   • CKD (chronic kidney disease) stage 3, GFR 30-59 ml/min (CMS/HCC)   • Hypothyroidism (acquired)     Past Medical History:   Diagnosis Date   • Arthritis    • Atrial fibrillation (CMS/HCC)    • Back pain    • Cancer (CMS/HCC)     bladder   • DDD (degenerative disc disease), lumbar 2020   • Diabetes mellitus (CMS/HCC)    • Disease of thyroid gland    • Function kidney decreased    • Heart valve replaced    • Hyperlipidemia    • Hypertension    • Lumbar back pain 2020   • Lumbar radiculopathy 2020   • Neuropathy    • Sleep apnea      Past Surgical History:   Procedure Laterality Date   • CARDIAC VALVE REPLACEMENT     • CYSTOSCOPY BLADDER BIOPSY     • LUMBAR LAMINECTOMY WITH FUSION Right 2020    Procedure: RIGHT L3-4, HEMILAMINECTOMY, FACETECTOMY, DISCECTOMY, TRANSFORAMINAL LUMBAR INTERBODY FUSION, POSTERIOR SPINAL FUSION WITH INSTRUMENTATION.;  Surgeon: MIKI Cloud MD;  Location: [facility];  Service: Orthopedic Spine;  Laterality: Right;   • VASECTOMY            OT ASSESSMENT FLOWSHEET (last 12 hours)      Occupational Therapy Evaluation     Row Name 20 0758                    OT Evaluation Time/Intention    Subjective Information  complains of;weakness;fatigue;numbness;pain chronic numbness/tingling at bilat feet  -        Document Type  evaluation  -        Mode of Treatment  occupational therapy  -           General Information    Patient Profile Reviewed?  yes  -        Onset of Illness/Injury or Date of Surgery  08/10/20  -        Referring Physician  Dr. Cloud   -        Patient Observations  alert;cooperative;agree to therapy  -        General Observations of Patient  fowlers, O2 per NC, cont pulse ox,  -        Prior Level of Function  independent:;all household mobility;ADL's  -        Equipment Currently Used at Home  cane, straight;walker, rolling;shoe horn built in shower chair,  brush for bathing  -        Existing Precautions/Restrictions  fall;spinal  -        Equipment Issued to Patient  walker, front wheeled  -        Risks Reviewed  patient and family:;LOB;change in vital signs  -        Benefits Reviewed  patient and family:;improve function;increase independence;increase strength;increase balance  -           Relationship/Environment    Primary Source of Support/Comfort  spouse  -        Lives With  spouse  -           Resource/Environmental Concerns    Current Living Arrangements  home/apartment/condo  -           Cognitive Assessment/Interventions    Additional Documentation  Cognitive Assessment/Intervention (Group)  -           Cognitive Assessment/Intervention- PT/OT    Affect/Mental Status (Cognitive)  WNL  -        Orientation Status (Cognition)  oriented x 4  -CH        Follows Commands (Cognition)  WNL  -        Cognitive Function (Cognitive)  memory deficit  -        Memory Deficit (Cognitive)  short term memory  -        Personal Safety Interventions  fall prevention program maintained;muscle strengthening facilitated;gait belt;nonskid shoes/slippers when out of bed;supervised activity  -            Safety Issues, Functional Mobility    Impairments Affecting Function (Mobility)  balance;endurance/activity tolerance;pain  -           Bed Mobility Assessment/Treatment    Bed Mobility Assessment/Treatment  rolling left;sidelying-sit  -        Rolling Left [place] (Bed Mobility)  moderate assist (50% patient effort);minimum assist (75% patient effort);2 person assist;verbal cues  -        Sidelying-Sit [place] (Bed Mobility)  moderate assist (50% patient effort);2 person assist  -        Bed Mobility, Safety Issues  decreased use of legs for bridging/pushing  -        Assistive Device (Bed Mobility)  head of bed elevated;bed rails;draw sheet  -           Functional Mobility    Functional Mobility- Ind. Level  minimum assist (75% patient effort);moderate assist (50% patient effort);2 person assist required;verbal cues required  -        Functional Mobility- Device  rolling walker  -        Functional Mobility- Comment  to chair   -           Transfer Assessment/Treatment    Transfer Assessment/Treatment  sit-stand transfer;stand-sit transfer  -           Sit-Stand Transfer    Sit-Stand [place] (Transfers)  moderate assist (50% patient effort);2 person assist;verbal cues  -        Assistive Device (Sit-Stand Transfers)  walker, front-wheeled  -           Stand-Sit Transfer    Stand-Sit [place] (Transfers)  moderate assist (50% patient effort);2 person assist;verbal cues  -           ADL Assessment/Intervention    BADL Assessment/Intervention  lower body dressing;upper body dressing  -           Upper Body Dressing Assessment/Training    Upper Body Dressing [place] Level  minimum assist (75% patient effort);don  -        Upper Body Dressing Position  unsupported sitting  -        Comment (Upper Body Dressing)  LSO  -           Lower Body Dressing Assessment/Training    Lower Body Dressing [place] Level  minimum assist (75% patient  effort);don;shoes/slippers  -        Lower Body Dressing Position  edge of bed sitting  -        Comment (Lower Body Dressing)  assist to bring backs of slippers over heels  -           BADL Safety/Performance    Impairments, BADL Safety/Performance  balance;endurance/activity tolerance;pain;strength;trunk/postural control  -           General ROM    GENERAL ROM COMMENTS  BUE AROM WFL for age & posture  -           Motor Assessment/Interventions    Additional Documentation  Balance (Group)  -           Balance    Balance  static sitting balance;static standing balance;dynamic standing balance  -           Static Sitting Balance    Level of [place] (Unsupported Sitting, Static Balance)  supervision  -        Sitting Position (Unsupported Sitting, Static Balance)  sitting on edge of bed  -           Static Standing Balance    Level of [place] (Supported Standing, Static Balance)  moderate assist, 50 to 74% patient effort;2 person assist  -        Assistive Device Utilized (Supported Standing, Static Balance)  walker, rolling  -           Positioning and Restraints    Pre-Treatment Position  in bed  -        Post Treatment Position  chair  -        In Chair  sitting;call light within reach;encouraged to call for assist;with family/caregiver;with nsg;with brace  -           Pain Assessment    Additional Documentation  Pain Scale: Numbers Pre/Post-Treatment (Group)  -           Pain Scale: Numbers Pre/Post-Treatment    Pain Scale: Numbers, Pretreatment  4/10  -        Pain Scale: Numbers, Post-Treatment  4/10  -        Pain Location - Side  Right  -        Pain Location  hip  -           Orthotics & Prosthetics Management    Orthosis Location  spinal orthosis  -        Additional Documentation  Orthosis Location (Row)  -           Spinal Orthosis Management    Type (Spinal Orthosis)  LSO (lumbar sacral orthosis)  -        Functional Design (Spinal Orthosis)  static  orthosis  -        Therapeutic Indications (Spinal Orthosis)  pain management;post-op positioning/protection;stabilization and support  -        Wearing Schedule (Spinal Orthosis)  wear when out of bed only  -        Orthosis Training (Spinal Orthosis)  patient and caregiver;all orthosis skills  -           Plan of Care Review    Plan of Care Reviewed With  patient;spouse  -        Progress  no change  -           Clinical Impression (OT)    Date of Referral to OT  08/10/20  -        OT Diagnosis  decline in ADLs  -        Patient/Family Goals Statement (OT Eval)  to go to rehab  -        Criteria for Skilled Therapeutic Interventions Met (OT Eval)  yes;treatment indicated  -        Rehab Potential (OT Eval)  good, to achieve stated therapy goals  -        Therapy Frequency (OT Eval)  5 times/wk  -        Predicted Duration of Therapy Intervention (Therapy Eval)  until DC from this facility  -        Care Plan Review (OT)  evaluation/treatment results reviewed;care plan/treatment goals reviewed;risks/benefits reviewed;current/potential barriers reviewed;patient/other agree to care plan  -        Care Plan Review, Other Participant (OT Eval)  spouse  -        Anticipated Discharge Disposition (OT)  inpatient rehabilitation facility  -           Planned OT Interventions    Planned Therapy Interventions (OT Eval)  adaptive equipment training;activity tolerance training;BADL retraining;functional balance retraining;occupation/activity based interventions;patient/caregiver education/training;ROM/therapeutic exercise;strengthening exercise;transfer/mobility retraining;orthotic fabrication/fitting/training  -           OT Goals    Transfer Goal Selection (OT)  transfer, OT goal 1  -CH        Dressing Goal Selection (OT)  dressing, OT goal 1  -        Toileting Goal Selection (OT)  toileting, OT goal 1  -CH           Transfer Goal 1 (OT)    Activity/Assistive Device (Transfer Goal 1, OT)   sit-to-stand/stand-to-sit;bed-to-chair/chair-to-bed;commode;walker, rolling  -        [place] Level/Cues Needed (Transfer Goal 1, OT)  minimum assist (75% or more patient effort)  -CH        Time Frame (Transfer Goal 1, OT)  long term goal (LTG);by discharge  -CH        Barriers (Transfers Goal 1, OT)  .  -CH        Progress/Outcome (Transfer Goal 1, OT)  goal ongoing  -CH           Dressing Goal 1 (OT)    Activity/Assistive Device (Dressing Goal 1, OT)  upper body dressing LSO  -        [place]/Cues Needed (Dressing Goal 1, OT)  supervision required;verbal cues required  -CH        Time Frame (Dressing Goal 1, OT)  long term goal (LTG);by discharge  -CH        Barriers (Dressing Goal 1, OT)  .  -CH        Progress/Outcome (Dressing Goal 1, OT)  goal ongoing  -           Toileting Goal 1 (OT)    Activity/Device (Toileting Goal 1, OT)  toileting skills, all;adjust/manage clothing;perform perineal hygiene;commode, 3-in-1;commode;grab bar/safety frame  -        [place] Level/Cues Needed (Toileting Goal 1, OT)  moderate assist (50-74% patient effort);verbal cues required  -CH        Time Frame (Toileting Goal 1, OT)  long term goal (LTG);by discharge  -CH        Barriers (Toileting Goal 1, OT)  .  -CH        Progress/Outcome (Toileting Goal 1, OT)  goal ongoing  -          User Key  (r) = Recorded By, (t) = Taken By, (c) = Cosigned By    Initials Name Effective Dates     Yancy Gordon OTR/DEBORAH 07/05/20 -          Occupational Therapy Education                 Title: PT OT SLP Therapies (Done)     Topic: Occupational Therapy (Done)     Point: ADL training (Done)     Description:   Instruct learner(s) on proper safety adaptation and remediation techniques during self care or transfers.   Instruct in proper use of assistive devices.              Learning Progress Summary           Patient Acceptance, E,D, VU,NR by  at 8/11/2020 0905   Family Acceptance, E,D, VU,NR by  at 8/11/2020 0905                    Point: Home exercise program (Done)     Description:   Instruct learner(s) on appropriate technique for monitoring, assisting and/or progressing therapeutic exercises/activities.              Learning Progress Summary           Patient Acceptance, E,D, VU,NR by  at 8/11/2020 0905   Family Acceptance, E,D, VU,NR by  at 8/11/2020 0905                   Point: Precautions (Done)     Description:   Instruct learner(s) on prescribed precautions during self-care and functional transfers.              Learning Progress Summary           Patient Acceptance, E,D, VU,NR by  at 8/11/2020 0905   Family Acceptance, E,D, VU,NR by  at 8/11/2020 0905                   Point: Body mechanics (Done)     Description:   Instruct learner(s) on proper positioning and spine alignment during self-care, functional mobility activities and/or exercises.              Learning Progress Summary           Patient Acceptance, E,D, VU,NR by  at 8/11/2020 0905   Family Acceptance, E,D, VU,NR by  at 8/11/2020 0905                               User Key     Initials Effective Dates Name Provider Type Discipline     07/05/20 -  Yancy Gordon, OTR/L Occupational Therapist OT                  OT Recommendation and Plan  Outcome Summary/Treatment Plan (OT)  Anticipated Discharge Disposition (OT): inpatient rehabilitation facility  Planned Therapy Interventions (OT Eval): adaptive equipment training, activity tolerance training, BADL retraining, functional balance retraining, occupation/activity based interventions, patient/caregiver education/training, ROM/therapeutic exercise, strengthening exercise, transfer/mobility retraining, orthotic fabrication/fitting/training  Therapy Frequency (OT Eval): 5 times/wk  Plan of Care Review  Plan of Care Reviewed With: patient, spouse  Plan of Care Reviewed With: patient, spouse    Outcome Measures     Row Name 08/11/20 0758             How much help from another is currently needed...     Putting on and taking off regular lower body clothing?  2  -CH      Bathing (including washing, rinsing, and drying)  2  -CH      Toileting (which includes using toilet bed pan or urinal)  2  -CH      Putting on and taking off regular upper body clothing  3  -CH      Taking care of personal grooming (such as brushing teeth)  4  -CH      Eating meals  4  -CH      AM-PAC 6 Clicks Score (OT)  17  -         Functional Assessment    Outcome Measure Options  AM-PAC 6 Clicks Daily Activity (OT)  -        User Key  (r) = Recorded By, (t) = Taken By, (c) = Cosigned By    Initials Name Provider Type    Yancy Charles OTR/L Occupational Therapist          Time Calculation:   Time Calculation- OT     Row Name 08/11/20 0758             Time Calculation- OT    OT Start Time  0758  -      OT Stop Time  0845  -      OT Time Calculation (min)  47 min  -      OT Received On  08/11/20  -      OT Goal Re-Cert Due Date  08/21/20  -        User Key  (r) = Recorded By, (t) = Taken By, (c) = Cosigned By    Initials Name Provider Type    Yancy Charles OTR/L Occupational Therapist        Therapy Charges for Today     Code Description Service Date Service Provider Modifiers Qty    69495004004 HC OT EVAL MOD COMPLEXITY 3 8/11/2020 Yancy Gordon OTR/L GO 1               JAMEEL Lundy/DEBORAH  8/11/2020

## 2020-08-11 NOTE — NURSING NOTE
Patient is a DA from [facility]) to [facility] for possible POT_OP infection vs Abscess. Arrived at 1900 during change of shift. Patient is A+O- states he has not been really able to ambulate since going home on Sunday from surgery on Friday- it sounds like Home Health came to help out but his lower back pain was getting worse with pain shooting down his right leg- and he was having difficulty lifting up RLE. Reg on-call and gave some orders. Will keep NPO for possible surgery in the a.m. Bedside report given to FIONA Martins

## 2020-08-11 NOTE — DISCHARGE PLACEMENT REQUEST
"Darleen Lugo [phone]  OsminShabbir resendez (75 y.o. Male)     Date of Birth Social Security Number Address Home Phone MRN    1945  [address] [facility] [phone] [id number]    Amish Marital Status          Other        Admission Date Admission Type Admitting Provider Attending Provider Department, Room/Bed    8/10/20 Urgent MIKI Cloud MD Strenge, K Brandon, MD [facility] 3A, 336/1    Discharge Date Discharge Disposition Discharge Destination                       Attending Provider:  MIKI Cloud MD    Allergies:  Advair Diskus [Fluticasone-salmeterol], Penicillins    Isolation:  None   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)   Code Status:  CPR    Ht:  210.8 cm (83\")   Wt:  141 kg (310 lb)    Admission Cmt:  None   Principal Problem:  None                Active Insurance as of 8/10/2020     Primary Coverage     Payor Plan Insurance Group Employer/Plan Group    MEDICARE MEDICARE A & B      Payor Plan Address Payor Plan Phone Number Payor Plan Fax Number Effective Dates    [address] [phone]  7/1/2011 - None Entered    [place] [place] [facility] [address]       Subscriber Name Subscriber Birth Date Member ID       SHABBIR WINTER 1945 [id number]           Secondary Coverage     Payor Plan Insurance Group Employer/Plan Group    [facility] [id number]     Payor Plan Address Payor Plan Phone Number Payor Plan Fax Number Effective Dates    [address]   7/1/2011 - None Entered    [facility] [id number]       Subscriber Name Subscriber Birth Date Member ID       SHABBIR WINTER 1945 [id number]                 Emergency Contacts      (Rel.) Home Phone Work Phone Mobile Phone    ISMAEL WINTER (Spouse) -- -- [phone]            History & Physical    No notes of this type exist for this encounter.           Current Facility-Administered Medications   Medication Dose Route Frequency Provider Last Rate Last Dose   • " acetaminophen (TYLENOL) tablet 650 mg  650 mg Oral Q6H PRN Anish Finney MD       • aspirin chewable tablet 81 mg  81 mg Oral Daily Howard Aguilar MD   81 mg at 08/11/20 0851   • atorvastatin (LIPITOR) tablet 10 mg  10 mg Oral Nightly Howard Aguilar MD       • dextrose (D50W) 25 g/ 50mL Intravenous Solution 25 g  25 g Intravenous Q15 Min PRN Howard Aguilar MD       • dextrose (GLUTOSE) oral gel 15 g  15 g Oral Q15 Min PRN Howard Aguilar MD       • doxycycline (ADOXA) tablet 100 mg  100 mg Oral Q12H Howard Aguilar MD   100 mg at 08/11/20 0849   • furosemide (LASIX) tablet 40 mg  40 mg Oral BID MIKI Cloud MD   40 mg at 08/11/20 0849   • glipizide (GLUCOTROL) tablet 5 mg  5 mg Oral Daily MIKI Cloud MD   5 mg at 08/11/20 0849   • glucagon (human recombinant) (GLUCAGEN DIAGNOSTIC) injection 1 mg  1 mg Subcutaneous Q15 Min PRN Howard Aguilar MD       • guaiFENesin (MUCINEX) 12 hr tablet 1,200 mg  1,200 mg Oral Daily Howard Aguilar MD   1,200 mg at 08/11/20 0849   • HYDROmorphone (DILAUDID) injection 0.5 mg  0.5 mg Intravenous Q2H PRN Anish Finney MD   0.5 mg at 08/11/20 0525   • insulin lispro (humaLOG) injection 2-7 Units  2-7 Units Subcutaneous TID AC Howard Aguilar MD       • levothyroxine (SYNTHROID, LEVOTHROID) tablet 175 mcg  175 mcg Oral Q AM Howard Aguilar MD   175 mcg at 08/11/20 0849   • magnesium citrate solution 296 mL  296 mL Oral Daily PRN Howard Aguilar MD       • metoprolol succinate XL (TOPROL-XL) 24 hr tablet 100 mg  100 mg Oral Q12H MIKI Cloud MD   100 mg at 08/11/20 0849   • montelukast (SINGULAIR) tablet 10 mg  10 mg Oral Nightly Howard Aguilar MD       • ondansetron (ZOFRAN) injection 4 mg  4 mg Intravenous Q6H PRN Anish Finney MD       • oxyCODONE-acetaminophen (PERCOCET) 7.5-325 MG per tablet 1 tablet  1 tablet Oral Q4H PRN Anish Finney MD   1 tablet at  08/11/20 0732   • oxyCODONE-acetaminophen (PERCOCET) 7.5-325 MG per tablet 1 tablet  1 tablet Oral Q4H PRN Howard Aguilar MD       • polyethylene glycol 3350 powder (packet)  17 g Oral BID Howard Aguilar MD   17 g at 08/11/20 0849   • potassium chloride (KLOR-CON) packet 20 mEq  20 mEq Oral Daily Howard Aguilar MD   20 mEq at 08/11/20 0848   • warfarin (COUMADIN) tablet 5 mg  5 mg Oral Daily Howard Aguilar MD         Operative/Procedure Notes (last 24 hours) (Notes from 08/10/20 1014 through 08/11/20 1014)    No notes of this type exist for this encounter.            Physician Progress Notes (last 24 hours) (Notes from 08/10/20 1014 through 08/11/20 1014)      Kyler Garcia PA at 08/11/20 0712          Orthopedic Spine Progress Note    Shabbir Solorio  75 y.o.  male      1. Back pain.  2. Severe right anterior thigh and leg radiculopathy.  3. Multilevel lumbar degenerative disk disease.  4. Multilevel lumbar facet arthropathy.  5. Large foraminal disk herniation , right L3-4.  6. Severe central and right-sided foraminal stenosis, L3-4.  7. Atrial fibrillation, on Coumadin.  8. History of bladder cancer.  9.  Status post right L3-4 hemilaminectomy decompression with complete facetectomy, neuroforaminotomy, discectomy,  TLIF L3-4, PSF with instrumentation L3-4, 8/7/2020       Systemic or Specific Complaints:     Patient discharged over the weekend after the above mentioned procedure. Patient had increased pain and weakness, specifically in the right lower extremity. Patient was eventually admitted to an outlying hospital, then transferred to [facility] for definitive care.   Objective     Vital signs in last 24 hours:  Temp:  [98 °F (36.7 °C)-98.8 °F (37.1 °C)] 98 °F (36.7 °C)  Heart Rate:  [] 97  Resp:  [18] 18  BP: (129-135)/(73-81) 135/77    Physical Exam:    A& X3, NAD  Incision CDI  B. LE NVI    Diagnostic Data:  CBC:  Results from last 7 days   Lab Units 08/09/20  3954    WBC 10*3/mm3 13.74*   RBC 10*6/mm3 3.55*   HEMOGLOBIN g/dL 10.9*   HEMATOCRIT % 33.6*   PLATELETS 10*3/mm3 169          Assessment/Plan     1.Status post right L3-4 hemilaminectomy decompression with complete facetectomy, neuroforaminotomy, discectomy,  TLIF L3-4, PSF with instrumentation L3-4, 8/7/2020    Plan:  1. Consult PT/OT  2. Plan to d/c to outpatient rehab  3. Pain Control  4. Brace       RAMANA Monsalve    Date: 8/11/2020  Time: 07:12    Electronically signed by Kyler Garcia PA at 08/11/20 0717          Consult Notes (last 24 hours) (Notes from 08/10/20 1014 through 08/11/20 1014)      Howard Aguilar MD at 08/11/20 0742              Referring Provider: Dr. Cloud  Reason for Consultation: Medical management    Patient Care Team:  Pilo Costa MD as PCP - General (Family Medicine)    Chief complaint chronic low back pain    Subjective .     History of present illness:  The patient presents today after surgical correction last week.  They have been through anti-inflammatories, muscle relaxers, and pain medication.  The pain has progressed to the point in time where it is affecting their activities of daily living since his recent surgery.  Postoperative pain is as expected.  There are no other precipitating or relieving factors. I have been requested by the Attending Physician to provide Medical Consultation in the perioperative period. The patient understands my role in their hospitalization and agrees with my treatment plan. They understand the importance of follow up with their PCP upon discharge  from [facility] for any concerns or abnormalities.      REVIEW OF SYSTEMS:    CONSTITUTIONAL:  Negative for anorexia, chills, fevers, night sweats and weight loss  EYES:  negative for eye dryness, icterus and redness  HEENT:   negative for dental problems, epistaxis, facial trauma and thrush  RESPIRATORY:  negative for chest tightness, cough, dyspnea on exertion, pneumonia and  sputum  CARDIOVASCULAR: negative for chest pain, dyspnea, exertional chest pressure/discomfort, irregular heart beat, palpitations, paroxysmal nocturnal dyspnea and syncope  GASTROINTESTINAL:  negative for abdominal pain, hematemesis, jaundice, melena and rectal bleeding. No significant changes in bowel habits preoperatively.   MUSCULOSKELETAL:  negative for muscle weakness, myalgias and neck pain, outside of surgical issues noted above  NEUROLOGICAL:   negative for dizziness, headaches, seizures, speech problems, tremors and vertigo  INTEGUMENT: negative for pruritus, rash, skin color change and skin lesion(s)         History    Past Medical History:   Diagnosis Date   • Arthritis    • Atrial fibrillation (CMS/HCC)    • Back pain    • Cancer (CMS/HCC)     bladder   • DDD (degenerative disc disease), lumbar 8/7/2020   • Diabetes mellitus (CMS/HCC)    • Disease of thyroid gland    • Function kidney decreased    • Heart valve replaced    • Hyperlipidemia    • Hypertension    • Lumbar back pain 8/7/2020   • Lumbar radiculopathy 8/7/2020   • Neuropathy    • Sleep apnea      Past Surgical History:   Procedure Laterality Date   • CARDIAC VALVE REPLACEMENT     • CYSTOSCOPY BLADDER BIOPSY     • LUMBAR LAMINECTOMY WITH FUSION Right 8/7/2020    Procedure: RIGHT L3-4, HEMILAMINECTOMY, FACETECTOMY, DISCECTOMY, TRANSFORAMINAL LUMBAR INTERBODY FUSION, POSTERIOR SPINAL FUSION WITH INSTRUMENTATION.;  Surgeon: MIKI Cloud MD;  Location: [facility];  Service: Orthopedic Spine;  Laterality: Right;   • VASECTOMY       No family history on file.  Social History     Tobacco Use   • Smoking status: Former Smoker   • Smokeless tobacco: Never Used   • Tobacco comment: quit 40 years ago   Substance Use Topics   • Alcohol use: Never     Frequency: Never   • Drug use: Never     Medications Prior to Admission   Medication Sig Dispense Refill Last Dose   • aspirin 81 MG chewable tablet Chew 81 mg Daily.   8/7/2020 at Unknown time   •  atorvastatin (LIPITOR) 10 MG tablet Take 10 mg by mouth Daily.   8/7/2020 at 2000   • furosemide (LASIX) 40 MG tablet Take 40 mg by mouth 2 (Two) Times a Day.   8/7/2020 at 2000   • glipizide (GLUCOTROL) 5 MG tablet Take 5 mg by mouth Daily.   8/7/2020 at 0800   • guaiFENesin (Mucinex) 600 MG 12 hr tablet Take 1,200 mg by mouth Daily.   8/7/2020 at Unknown time   • levothyroxine (SYNTHROID, LEVOTHROID) 175 MCG tablet Take 175 mcg by mouth Daily.   8/7/2020 at 1000   • metFORMIN (GLUCOPHAGE) 1000 MG tablet Take 1,000 mg by mouth 2 (Two) Times a Day With Meals.   8/7/2020 at 2000   • metoprolol succinate XL (TOPROL-XL) 100 MG 24 hr tablet Take 100 mg by mouth 2 (two) times a day.   8/7/2020 at 0645   • montelukast (SINGULAIR) 10 MG tablet Take 10 mg by mouth Every Night.   8/7/2020 at 2000   • Omega-3 Fatty Acids (FISH OIL) 1200 MG capsule capsule Take 1,200 mg by mouth Daily.   8/7/2020 at 2000   • oxyCODONE-acetaminophen (PERCOCET) 7.5-325 MG per tablet Take 1 tablet by mouth Every 4 (Four) Hours As Needed for Moderate Pain  for up to 9 days.      • potassium chloride (KLOR-CON) 20 MEQ packet Take 20 mEq by mouth Daily.      • vitamin C (ASCORBIC ACID) 500 MG tablet Take 500 mg by mouth Daily.   8/7/2020 at 0800   • vitamin D (ERGOCALCIFEROL) 1.25 MG (93762 UT) capsule capsule Take 50,000 Units by mouth 1 (One) Time Per Week.   8/7/2020 at Unknown time   • warfarin (COUMADIN) 5 MG tablet Take 5 mg by mouth Daily.   8/8/20 at 1710       Allergies:  Advair diskus [fluticasone-salmeterol] and Penicillins    Objective     Vital Signs   Temp:  [98 °F (36.7 °C)-98.8 °F (37.1 °C)] 98 °F (36.7 °C)  Heart Rate:  [] 97  Resp:  [18] 18  BP: (129-135)/(73-81) 135/77          Physical Exam:  Constitutional: oriented to person, place, and time. appears well-developed.   HEENT:   Head: Normocephalic and atraumatic.   Eyes: Pupils are equal, round, and reactive to light.   Neck: Neck supple.   Cardiovascular: Chemical valve  noted  Pulmonary/Chest: Effort normal and breath sounds normal. CTAB  Abdominal: Soft. Bowel sounds are normal to hypoactive. No significant distension. There is no tenderness. There is no rebound and no guarding.   Musculoskeletal: Normal range of motion and no edema or tenderness outside of surgical area.   Neurological: Pt is alert and oriented to person, place, and time.  normal reflexes present.   Skin: Skin is warm and dry.     Results Review:   I reviewed the patient's new imaging results and agree with the interpretation.      Assessment/Plan       Lumbar radiculopathy      Type 2 DM- review labs and home medication. Discuss with patient importance of blood sugar control in the healing process. Review diet, medical,and lifestyle modifications for optimal medical treatment. Will restart their regular diabetic regimen and make consult for diabetic education / dietician available upon request.  Will hold his Glucophage since he recently had contrast and will place him on Accu-Cheks sliding scale insulin.    History of UTI/MRSA on previous urine- repeat urine place him on doxycycline 100 mg twice daily.    Constipation-start with Miralax 1 capful BID until BM, then decrease to 1 x a day,then can step up to Amitiza 24 mcg po BID which can be used for opioid induced constipation,and ultimately end up with Relistor 12 mcg subq every 48 hours to block the effect of narcotics on the gut.      Hypertension-review pre and post BP's,will restart home BP meds when BP allows. Recent studies demonstrate the need for patience and less reactivity for precipitous drop of BP in the acute care setting. Will educate staff to use other modalities to help reduce MAP prior to adding additional medical interventions. Add clonidine 0.1 mg every 4 hours prn if bp> 140/90,monitor BP and adjust meds as necessary.      Reviewed work-up at [place].  Place him back on his Coumadin dose with goal INR of 2-3.    I discussed the patient's  findings and my recommendations with patient, nursing staff and consulting provider    Howard Aguilar MD  20  07:42            Electronically signed by Howard Aguilar MD at 20 0745       Nutrition Notes (last 24 hours) (Notes from 08/10/20 1014 through 20 1014)    No notes exist for this encounter.         Physical Therapy Notes (last 24 hours) (Notes from 08/10/20 1014 through 20 1014)    No notes exist for this encounter.            Occupational Therapy Notes (last 24 hours) (Notes from 08/10/20 1014 through 20 1014)      Yancy Gordon, OTR/L at 20 0912          Acute Care - Occupational Therapy Initial Evaluation  [facility]     Patient Name: Shabbir Solorio  : 1945  MRN: [id number]  Today's Date: 2020  Onset of Illness/Injury or Date of Surgery: 08/10/20  Date of Referral to OT: 08/10/20  Referring Physician: Dr. Cloud     Admit Date: 8/10/2020       ICD-10-CM ICD-9-CM   1. Decreased activities of daily living (ADL) Z78.9 V49.89     Patient Active Problem List   Diagnosis   • Lumbar radiculopathy   • DDD (degenerative disc disease), lumbar   • Lumbar back pain   • Chronic atrial fibrillation (CMS/HCC)   • Long term current use of anticoagulant therapy   • Nonischemic cardiomyopathy (CMS/HCC)   • KEYONA (obstructive sleep apnea)   • S/P AVR (aortic valve replacement)   • LV dysfunction   • Type 2 diabetes mellitus (CMS/HCC)   • Essential hypertension   • Hyperlipidemia   • CKD (chronic kidney disease) stage 3, GFR 30-59 ml/min (CMS/HCC)   • Hypothyroidism (acquired)     Past Medical History:   Diagnosis Date   • Arthritis    • Atrial fibrillation (CMS/HCC)    • Back pain    • Cancer (CMS/HCC)     bladder   • DDD (degenerative disc disease), lumbar 2020   • Diabetes mellitus (CMS/HCC)    • Disease of thyroid gland    • Function kidney decreased    • Heart valve replaced    • Hyperlipidemia    • Hypertension    • Lumbar back pain 2020   • Lumbar  radiculopathy 8/7/2020   • Neuropathy    • Sleep apnea      Past Surgical History:   Procedure Laterality Date   • CARDIAC VALVE REPLACEMENT     • CYSTOSCOPY BLADDER BIOPSY     • LUMBAR LAMINECTOMY WITH FUSION Right 8/7/2020    Procedure: RIGHT L3-4, HEMILAMINECTOMY, FACETECTOMY, DISCECTOMY, TRANSFORAMINAL LUMBAR INTERBODY FUSION, POSTERIOR SPINAL FUSION WITH INSTRUMENTATION.;  Surgeon: MIKI Cloud MD;  [facility];  Service: Orthopedic Spine;  Laterality: Right;   • VASECTOMY            OT ASSESSMENT FLOWSHEET (last 12 hours)      Occupational Therapy Evaluation     Row Name 08/11/20 0758                   OT Evaluation Time/Intention    Subjective Information  complains of;weakness;fatigue;numbness;pain chronic numbness/tingling at bilat feet  -        Document Type  evaluation  -        Mode of Treatment  occupational therapy  -           General Information    Patient Profile Reviewed?  yes  -        Onset of Illness/Injury or Date of Surgery  08/10/20  -        Referring Physician  Dr. Cloud   -        Patient Observations  alert;cooperative;agree to therapy  -        General Observations of Patient  fowlers, O2 per NC, cont pulse ox,  -        Prior Level of Function  independent:;all household mobility;ADL's  -        Equipment Currently Used at Home  cane, straight;walker, rolling;shoe horn built in shower chair, lh brush for bathing  -        Existing Precautions/Restrictions  fall;spinal  -        Equipment Issued to Patient  walker, front wheeled  -        Risks Reviewed  patient and family:;LOB;change in vital signs  -        Benefits Reviewed  patient and family:;improve function;increase independence;increase strength;increase balance  -           Relationship/Environment    Primary Source of Support/Comfort  spouse  -        Lives With  spouse  -           Resource/Environmental Concerns    Current Living Arrangements  home/apartment/condo  -            Cognitive Assessment/Interventions    Additional Documentation  Cognitive Assessment/Intervention (Group)  -           Cognitive Assessment/Intervention- PT/OT    Affect/Mental Status (Cognitive)  WNL  -        Orientation Status (Cognition)  oriented x 4  -        Follows Commands (Cognition)  WNL  -        Cognitive Function (Cognitive)  memory deficit  -        Memory Deficit (Cognitive)  short term memory  -        Personal Safety Interventions  fall prevention program maintained;muscle strengthening facilitated;gait belt;nonskid shoes/slippers when out of bed;supervised activity  -           Safety Issues, Functional Mobility    Impairments Affecting Function (Mobility)  balance;endurance/activity tolerance;pain  -           Bed Mobility Assessment/Treatment    Bed Mobility Assessment/Treatment  rolling left;sidelying-sit  -        Rolling Left [place] (Bed Mobility)  moderate assist (50% patient effort);minimum assist (75% patient effort);2 person assist;verbal cues  -        Sidelying-Sit [place] (Bed Mobility)  moderate assist (50% patient effort);2 person assist  -        Bed Mobility, Safety Issues  decreased use of legs for bridging/pushing  -        Assistive Device (Bed Mobility)  head of bed elevated;bed rails;draw sheet  -           Functional Mobility    Functional Mobility- Ind. Level  minimum assist (75% patient effort);moderate assist (50% patient effort);2 person assist required;verbal cues required  -        Functional Mobility- Device  rolling walker  -        Functional Mobility- Comment  to chair   -           Transfer Assessment/Treatment    Transfer Assessment/Treatment  sit-stand transfer;stand-sit transfer  -           Sit-Stand Transfer    Sit-Stand [place] (Transfers)  moderate assist (50% patient effort);2 person assist;verbal cues  -        Assistive Device (Sit-Stand Transfers)  walker, front-wheeled  -           Stand-Sit  Transfer    Stand-Sit [place] (Transfers)  moderate assist (50% patient effort);2 person assist;verbal cues  -           ADL Assessment/Intervention    BADL Assessment/Intervention  lower body dressing;upper body dressing  -           Upper Body Dressing Assessment/Training    Upper Body Dressing [place] Level  minimum assist (75% patient effort);don  -        Upper Body Dressing Position  unsupported sitting  -        Comment (Upper Body Dressing)  LSO  -           Lower Body Dressing Assessment/Training    Lower Body Dressing [place] Level  minimum assist (75% patient effort);don;shoes/slippers  -        Lower Body Dressing Position  edge of bed sitting  -        Comment (Lower Body Dressing)  assist to bring backs of slippers over heels  -           BADL Safety/Performance    Impairments, BADL Safety/Performance  balance;endurance/activity tolerance;pain;strength;trunk/postural control  -           General ROM    GENERAL ROM COMMENTS  BUE AROM WFL for age & posture  -           Motor Assessment/Interventions    Additional Documentation  Balance (Group)  -           Balance    Balance  static sitting balance;static standing balance;dynamic standing balance  -           Static Sitting Balance    Level of [place] (Unsupported Sitting, Static Balance)  supervision  -        Sitting Position (Unsupported Sitting, Static Balance)  sitting on edge of bed  -           Static Standing Balance    Level of [place] (Supported Standing, Static Balance)  moderate assist, 50 to 74% patient effort;2 person assist  -        Assistive Device Utilized (Supported Standing, Static Balance)  walker, rolling  -           Positioning and Restraints    Pre-Treatment Position  in bed  -        Post Treatment Position  chair  -        In Chair  sitting;call light within reach;encouraged to call for assist;with family/caregiver;with nsg;with brace  -           Pain Assessment     Additional Documentation  Pain Scale: Numbers Pre/Post-Treatment (Group)  -           Pain Scale: Numbers Pre/Post-Treatment    Pain Scale: Numbers, Pretreatment  4/10  -CH        Pain Scale: Numbers, Post-Treatment  4/10  -        Pain Location - Side  Right  -        Pain Location  hip  -           Orthotics & Prosthetics Management    Orthosis Location  spinal orthosis  -        Additional Documentation  Orthosis Location (Row)  -           Spinal Orthosis Management    Type (Spinal Orthosis)  LSO (lumbar sacral orthosis)  -        Functional Design (Spinal Orthosis)  static orthosis  -        Therapeutic Indications (Spinal Orthosis)  pain management;post-op positioning/protection;stabilization and support  -        Wearing Schedule (Spinal Orthosis)  wear when out of bed only  -        Orthosis Training (Spinal Orthosis)  patient and caregiver;all orthosis skills  -           Plan of Care Review    Plan of Care Reviewed With  patient;spouse  -        Progress  no change  -           Clinical Impression (OT)    Date of Referral to OT  08/10/20  -        OT Diagnosis  decline in ADLs  -        Patient/Family Goals Statement (OT Eval)  to go to rehab  -        Criteria for Skilled Therapeutic Interventions Met (OT Eval)  yes;treatment indicated  -        Rehab Potential (OT Eval)  good, to achieve stated therapy goals  -        Therapy Frequency (OT Eval)  5 times/wk  -        Predicted Duration of Therapy Intervention (Therapy Eval)  until DC from this facility  -        Care Plan Review (OT)  evaluation/treatment results reviewed;care plan/treatment goals reviewed;risks/benefits reviewed;current/potential barriers reviewed;patient/other agree to care plan  -        Care Plan Review, Other Participant (OT Eval)  spouse  -        Anticipated Discharge Disposition (OT)  inpatient rehabilitation facility  -           Planned OT Interventions    Planned Therapy  Interventions (OT Eval)  adaptive equipment training;activity tolerance training;BADL retraining;functional balance retraining;occupation/activity based interventions;patient/caregiver education/training;ROM/therapeutic exercise;strengthening exercise;transfer/mobility retraining;orthotic fabrication/fitting/training  -           OT Goals    Transfer Goal Selection (OT)  transfer, OT goal 1  -CH        Dressing Goal Selection (OT)  dressing, OT goal 1  -CH        Toileting Goal Selection (OT)  toileting, OT goal 1  -CH           Transfer Goal 1 (OT)    Activity/Assistive Device (Transfer Goal 1, OT)  sit-to-stand/stand-to-sit;bed-to-chair/chair-to-bed;commode;walker, rolling  -CH        [place] Level/Cues Needed (Transfer Goal 1, OT)  minimum assist (75% or more patient effort)  -CH        Time Frame (Transfer Goal 1, OT)  long term goal (LTG);by discharge  -CH        Barriers (Transfers Goal 1, OT)  .  -CH        Progress/Outcome (Transfer Goal 1, OT)  goal ongoing  -CH           Dressing Goal 1 (OT)    Activity/Assistive Device (Dressing Goal 1, OT)  upper body dressing LSO  -CH        Yancey/Cues Needed (Dressing Goal 1, OT)  supervision required;verbal cues required  -CH        Time Frame (Dressing Goal 1, OT)  long term goal (LTG);by discharge  -CH        Barriers (Dressing Goal 1, OT)  .  -CH        Progress/Outcome (Dressing Goal 1, OT)  goal ongoing  -CH           Toileting Goal 1 (OT)    Activity/Device (Toileting Goal 1, OT)  toileting skills, all;adjust/manage clothing;perform perineal hygiene;commode, 3-in-1;commode;grab bar/safety frame  -CH        Yancey Level/Cues Needed (Toileting Goal 1, OT)  moderate assist (50-74% patient effort);verbal cues required  -CH        Time Frame (Toileting Goal 1, OT)  long term goal (LTG);by discharge  -CH        Barriers (Toileting Goal 1, OT)  .  -CH        Progress/Outcome (Toileting Goal 1, OT)  goal ongoing  -          User Key  (r) = Recorded  By, (t) = Taken By, (c) = Cosigned By    Initials Name Effective Dates     Yancy Gordon OTR/L 07/05/20 -          Occupational Therapy Education                 Title: PT OT SLP Therapies (Done)     Topic: Occupational Therapy (Done)     Point: ADL training (Done)     Description:   Instruct learner(s) on proper safety adaptation and remediation techniques during self care or transfers.   Instruct in proper use of assistive devices.              Learning Progress Summary           Patient Acceptance, E,D, VU,NR by  at 8/11/2020 0905   Family Acceptance, E,D, VU,NR by  at 8/11/2020 0905                   Point: Home exercise program (Done)     Description:   Instruct learner(s) on appropriate technique for monitoring, assisting and/or progressing therapeutic exercises/activities.              Learning Progress Summary           Patient Acceptance, E,D, VU,NR by  at 8/11/2020 0905   Family Acceptance, E,D, VU,NR by  at 8/11/2020 0905                   Point: Precautions (Done)     Description:   Instruct learner(s) on prescribed precautions during self-care and functional transfers.              Learning Progress Summary           Patient Acceptance, E,D, VU,NR by  at 8/11/2020 0905   Family Acceptance, E,D, VU,NR by  at 8/11/2020 0905                   Point: Body mechanics (Done)     Description:   Instruct learner(s) on proper positioning and spine alignment during self-care, functional mobility activities and/or exercises.              Learning Progress Summary           Patient Acceptance, E,D, VU,NR by  at 8/11/2020 0905   Family Acceptance, E,D, VU,NR by  at 8/11/2020 0905                               User Key     Initials Effective Dates Name Provider Type Discipline     07/05/20 -  Yancy Gordon OTR/L Occupational Therapist OT                  OT Recommendation and Plan  Outcome Summary/Treatment Plan (OT)  Anticipated Discharge Disposition (OT): inpatient rehabilitation  facility  Planned Therapy Interventions (OT Eval): adaptive equipment training, activity tolerance training, BADL retraining, functional balance retraining, occupation/activity based interventions, patient/caregiver education/training, ROM/therapeutic exercise, strengthening exercise, transfer/mobility retraining, orthotic fabrication/fitting/training  Therapy Frequency (OT Eval): 5 times/wk  Plan of Care Review  Plan of Care Reviewed With: patient, spouse  Plan of Care Reviewed With: patient, spouse    Outcome Measures     Row Name 08/11/20 0758             How much help from another is currently needed...    Putting on and taking off regular lower body clothing?  2  -CH      Bathing (including washing, rinsing, and drying)  2  -CH      Toileting (which includes using toilet bed pan or urinal)  2  -CH      Putting on and taking off regular upper body clothing  3  -CH      Taking care of personal grooming (such as brushing teeth)  4  -CH      Eating meals  4  -CH      AM-PAC 6 Clicks Score (OT)  17  -CH         Functional Assessment    Outcome Measure Options  AM-PAC 6 Clicks Daily Activity (OT)  -        User Key  (r) = Recorded By, (t) = Taken By, (c) = Cosigned By    Initials Name Provider Type    Yancy Charles OTR/L Occupational Therapist          Time Calculation:   Time Calculation- OT     Row Name 08/11/20 0758             Time Calculation- OT    OT Start Time  0758  -      OT Stop Time  0845  -      OT Time Calculation (min)  47 min  -      OT Received On  08/11/20  -      OT Goal Re-Cert Due Date  08/21/20  -        User Key  (r) = Recorded By, (t) = Taken By, (c) = Cosigned By    Initials Name Provider Type    Yancy Charles OTR/L Occupational Therapist        Therapy Charges for Today     Code Description Service Date Service Provider Modifiers Qty    [id number]  OT EVAL MOD COMPLEXITY 3 8/11/2020 Yancy Gordon OTR/L GO 1               Yancy Gordon  OTR/L  8/11/2020    Electronically signed by Yancy Gordon OTR/L at 08/11/20 0912     Yancy Gordon OTR/L at 08/11/20 0911          Problem: Patient Care Overview  Goal: Plan of Care Review  Flowsheets (Taken 8/11/2020 0758)  Progress: no change  Plan of Care Reviewed With: patient;spouse  Note:   OT eval completed.  Mr. Solorio was at this facility 8/7/2020 for lumbar sx.  He has readmitted 2' pain & R knee buckling. Pt presents fowlers & reports moderate pain & R LE numbness.  He was able to report 1/3 spinal precautions & LSO wearing schedule.  Mr. Solorio required Assist x 2 for bed mobility, t/fs & short steps to chair.  He required Min A for LSO MILAD & slip on slippers.  At time of eval his spouse was present & states she had increased back pain after attempting to help him at home, she also has a walking CAM boot on her foot; therefore, spouse is not safe to assist pt at home as he complains of R knee buckling.  Mr. Solorio would benefit from cont'd OT tx & DC to inpatient rehab to improve his fxn, safety, & indep in ADLs.        Electronically signed by Yancy Gordon OTR/L at 08/11/20 0911       Speech Language Pathology Notes (last 48 hours) (Notes from 08/09/20 1014 through 08/11/20 1014)    No notes exist for this encounter.       Respiratory Therapy Notes (last 24 hours) (Notes from 08/10/20 1014 through 08/11/20 1014)    No notes exist for this encounter.

## 2020-08-11 NOTE — PROGRESS NOTES
Discharge Planning Assessment  [facility]     Patient Name: Shabbir Solorio  MRN: [id number]  Today's Date: 8/11/2020    Admit Date: 8/10/2020    Discharge Needs Assessment     Row Name 08/11/20 1043       Living Environment    Lives With  spouse    Name(s) of Who Lives With Patient  Malou    Current Living Arrangements  home/apartment/condo    Primary Care Provided by  self;spouse/significant other    Provides Primary Care For  no one, unable/limited ability to care for self    Family Caregiver if Needed  spouse    Family Caregiver Names  Malou, spouse    Quality of Family Relationships  helpful;involved;supportive    Able to Return to Prior Arrangements  no    Living Arrangement Comments  not safe s/p spiinal surgery with difficult ambulation       Resource/Environmental Concerns    Resource/Environmental Concerns  none    Transportation Concerns  car, none       Transition Planning    Patient/Family Anticipates Transition to  inpatient rehabilitation facility    Patient/Family Anticipated Services at Transition  none    Transportation Anticipated  family or friend will provide       Discharge Needs Assessment    Readmission Within the Last 30 Days  lack of support;other (see comments) uncontrolled pain/unable to walk/ not safe in home with spouse with limitaions physically    Concerns to be Addressed  no discharge needs identified    Equipment Currently Used at Home  cane, straight;orthotic device;other (see comments) back brace per ortho-LSO    Anticipated Changes Related to Illness  inability to care for self    Equipment Needed After Discharge  none    Outpatient/Agency/Support Group Needs  skilled nursing facility    Provided Post Acute Provider List?  Yes    Post Acute Provider List  Inpatient Rehab;Nursing Home    Delivered To  Patient    Method of Delivery  In person    Patient's Choice of Community Agency(s)  [place], [place]    Discharge Coordination/Progress  Spoke with patient/spouse  regarding dc needs. #3 pod spinal surgery. Not able to safely stay in home. Unable to walk without assist rw/x1 cga. Weakened condition.. Difficult gait. High risk for fall. List of [place] facilities given. Discussed multiple facilities. [facility], and [facility] was suggested by ortho dc planner. Referral made by [facility] for [facility] SNF. pending bed offer. Wife has been to this facility before and seems satisfied. Patient has PCP and Rx coverage.         Discharge Plan    No documentation.       Destination      Service Provider Request Status Selected Services Address Phone Number Fax Number    [facility] Pending - Request Sent N/A [address] [address] [phone]      Durable Medical Equipment      Coordination has not been started for this encounter.      Dialysis/Infusion      Coordination has not been started for this encounter.      Home Medical Care      Coordination has not been started for this encounter.      Therapy      Coordination has not been started for this encounter.      Community Resources      Coordination has not been started for this encounter.          Demographic Summary    No documentation.       Functional Status    No documentation.       Psychosocial    No documentation.       Abuse/Neglect    No documentation.       Legal    No documentation.       Substance Abuse    No documentation.       Patient Forms    No documentation.           Zena Marcum RN

## 2020-08-11 NOTE — THERAPY EVALUATION
Patient Name: Shabbir Solorio  : 1945    MRN: [id number]                              Today's Date: 2020       Admit Date: 8/10/2020    Visit Dx:     ICD-10-CM ICD-9-CM   1. Decreased activities of daily living (ADL) Z78.9 V49.89   2. Impaired functional mobility, balance, gait, and endurance Z74.09 V49.89     Patient Active Problem List   Diagnosis   • Lumbar radiculopathy   • DDD (degenerative disc disease), lumbar   • Lumbar back pain   • Chronic atrial fibrillation (CMS/HCC)   • Long term current use of anticoagulant therapy   • Nonischemic cardiomyopathy (CMS/HCC)   • KEYONA (obstructive sleep apnea)   • S/P AVR (aortic valve replacement)   • LV dysfunction   • Type 2 diabetes mellitus (CMS/HCC)   • Essential hypertension   • Hyperlipidemia   • CKD (chronic kidney disease) stage 3, GFR 30-59 ml/min (CMS/HCC)   • Hypothyroidism (acquired)     Past Medical History:   Diagnosis Date   • Arthritis    • Atrial fibrillation (CMS/HCC)    • Back pain    • Cancer (CMS/HCC)     bladder   • DDD (degenerative disc disease), lumbar 2020   • Diabetes mellitus (CMS/HCC)    • Disease of thyroid gland    • Function kidney decreased    • Heart valve replaced    • Hyperlipidemia    • Hypertension    • Lumbar back pain 2020   • Lumbar radiculopathy 2020   • Neuropathy    • Sleep apnea      Past Surgical History:   Procedure Laterality Date   • CARDIAC VALVE REPLACEMENT     • CYSTOSCOPY BLADDER BIOPSY     • LUMBAR LAMINECTOMY WITH FUSION Right 2020    Procedure: RIGHT L3-4, HEMILAMINECTOMY, FACETECTOMY, DISCECTOMY, TRANSFORAMINAL LUMBAR INTERBODY FUSION, POSTERIOR SPINAL FUSION WITH INSTRUMENTATION.;  Surgeon: MIKI Cloud MD;  Location: [facility];  Service: Orthopedic Spine;  Laterality: Right;   • VASECTOMY       General Information     Row Name 20 0740          PT Evaluation Time/Intention    Document Type  evaluation s/p R hemilaminectomy L3-4, TLIF L3-4, PSF L3-4 on  due to low back  pain, R anterior thigh and leg radiculopathy. He presented to outside hospital with increase in pain and weakness in R LE. MRI from outside facility shows possible abscess  -MS     Mode of Treatment  physical therapy;co-treatment  -MS     Row Name 08/11/20 0740          General Information    Patient Profile Reviewed?  yes  -MS     Existing Precautions/Restrictions  fall;brace worn when out of bed;spinal  -MS     Barriers to Rehab  medically complex;physical barrier  -MS     Row Name 08/11/20 0740          Resource/Environmental Concerns    Current Living Arrangements  home/apartment/condo  -MS     Row Name 08/11/20 0740          Home Main Entrance    Number of Stairs, Main Entrance  two  -MS     Stair Railings, Main Entrance  none  -MS     Row Name 08/11/20 0740          Cognitive Assessment/Intervention- PT/OT    Orientation Status (Cognition)  oriented x 4  -MS     Personal Safety Interventions  fall prevention program maintained;gait belt;muscle strengthening facilitated;nonskid shoes/slippers when out of bed;supervised activity  -MS     Row Name 08/11/20 0740          Safety Issues, Functional Mobility    Safety Issues Affecting Function (Mobility)  safety precautions follow-through/compliance  -MS     Impairments Affecting Function (Mobility)  balance;coordination;endurance/activity tolerance;motor control;strength;range of motion (ROM)  -MS       User Key  (r) = Recorded By, (t) = Taken By, (c) = Cosigned By    Initials Name Provider Type    MS Sharon Fay R, PT, DPT, NCS Physical Therapist        Mobility     Row Name 08/11/20 0740          Bed Mobility Assessment/Treatment    Bed Mobility Assessment/Treatment  rolling left;sidelying-sit  -MS     Rolling Left [place] (Bed Mobility)  moderate assist (50% patient effort);minimum assist (75% patient effort);2 person assist;verbal cues  -MS     Sidelying-Sit [place] (Bed Mobility)  moderate assist (50% patient effort);2 person assist  -MS      Assistive Device (Bed Mobility)  head of bed elevated;bed rails;draw sheet  -MS     Comment (Bed Mobility)  pt has adjustable bed at home  -MS     Row Name 08/11/20 0740          Transfer Assessment/Treatment    Comment (Transfers)  steps to chair with blocking of R knee and assist to reach full extension  -MS     Row Name 08/11/20 0740          Bed-Chair Transfer    Bed-Chair [place] (Transfers)  moderate assist (50% patient effort);minimum assist (75% patient effort);2 person assist;verbal cues;nonverbal cues (demo/gesture)  -MS     Assistive Device (Bed-Chair Transfers)  walker, front-wheeled  -MS     Row Name 08/11/20 0740          Sit-Stand Transfer    Sit-Stand [place] (Transfers)  moderate assist (50% patient effort);2 person assist;verbal cues  -MS     Assistive Device (Sit-Stand Transfers)  walker, front-wheeled  -MS     Row Name 08/11/20 0740          Gait/Stairs Assessment/Training    Distance in Feet (Gait)  steps to chair, see above  -MS       User Key  (r) = Recorded By, (t) = Taken By, (c) = Cosigned By    Initials Name Provider Type    MS Sharon Fay R, PT, DPT, NCS Physical Therapist        Obj/Interventions     Row Name 08/11/20 0740          General ROM    GENERAL ROM COMMENTS  AROM: R hip flexion impaired 50%, R knee ext impaired 25%  -MS     Row Name 08/11/20 0740          MMT (Manual Muscle Testing)    General MMT Comments  R hip flexion 2+/5, R quad 3-/5  -MS     Row Name 08/11/20 0740          Static Sitting Balance    Level of [place] (Unsupported Sitting, Static Balance)  supervision  -MS     Sitting Position (Unsupported Sitting, Static Balance)  sitting on edge of bed  -MS     Row Name 08/11/20 0740          Static Standing Balance    Level of [place] (Supported Standing, Static Balance)  moderate assist, 50 to 74% patient effort;2 person assist  -MS     Assistive Device Utilized (Supported Standing, Static Balance)  walker, rolling  -MS     Row Name 08/11/20 0740           Dynamic Standing Balance    Level of [place], Reaches Outside Midline (Standing, Dynamic Balance)  moderate assist, 50 to 74% patient effort;2 person assist  -MS     Assistive Device Utilized (Supported Standing, Dynamic Balance)  walker, rolling  -MS     Row Name 08/11/20 0740          Sensory Assessment/Intervention    Sensory General Assessment  no sensation deficits identified  -MS       User Key  (r) = Recorded By, (t) = Taken By, (c) = Cosigned By    Initials Name Provider Type    MS Sumeet Sharon R, PT, DPT, NCS Physical Therapist        Goals/Plan     Row Name 08/11/20 0740          Bed Mobility Goal 1 (PT)    Activity/Assistive Device (Bed Mobility Goal 1, PT)  rolling to left;rolling to right;sidelying to sit;sit to sidelying;bed rails  -MS     [place] Level/Cues Needed (Bed Mobility Goal 1, PT)  minimum assist (75% or more patient effort);tactile cues required;verbal cues required  -MS     Time Frame (Bed Mobility Goal 1, PT)  long term goal (LTG);by discharge  -MS     Progress/Outcomes (Bed Mobility Goal 1, PT)  goal ongoing  -MS     Row Name 08/11/20 0740          Transfer Goal 1 (PT)    Activity/Assistive Device (Transfer Goal 1, PT)  sit-to-stand/stand-to-sit;bed-to-chair/chair-to-bed;walker, rolling  -MS     [place] Level/Cues Needed (Transfer Goal 1, PT)  minimum assist (75% or more patient effort);tactile cues required;verbal cues required  -MS     Time Frame (Transfer Goal 1, PT)  long term goal (LTG);by discharge  -MS     Progress/Outcome (Transfer Goal 1, PT)  goal ongoing  -MS     Row Name 08/11/20 0740          Gait Training Goal 1 (PT)    Activity/Assistive Device (Gait Training Goal 1, PT)  gait (walking locomotion);assistive device use;decrease fall risk;increase endurance/gait distance;walker, rolling  -MS     [place] Level (Gait Training Goal 1, PT)  minimum assist (75% or more patient effort);tactile cues required;verbal cues required  -MS     Distance (Gait  Goal 1, PT)  25ft with pt reaching R knee ext and no R knee buckling  -MS     Time Frame (Gait Training Goal 1, PT)  long term goal (LTG);by discharge  -MS     Progress/Outcome (Gait Training Goal 1, PT)  goal ongoing  -MS     Row Name 08/11/20 8340          ROM Goal 1 (PT)    ROM Goal 1 (PT)  R knee ext to reach 0 degrees against gravity  -MS     Time Frame (ROM Goal 1, PT)  long term goal (LTG);by discharge  -MS     Progress/Outcome (ROM Goal 1, PT)  goal ongoing  -MS       User Key  (r) = Recorded By, (t) = Taken By, (c) = Cosigned By    Initials Name Provider Type    MS Sumeet Sharon R, PT, DPT, NCS Physical Therapist        Clinical Impression     Row Name 08/11/20 0740          Pain Assessment    Additional Documentation  Pain Scale: Numbers Pre/Post-Treatment (Group)  -MS     Row Name 08/11/20 0740          Pain Scale: Numbers Pre/Post-Treatment    Pain Scale: Numbers, Pretreatment  4/10  -MS     Pain Scale: Numbers, Post-Treatment  4/10  -MS     Pain Location - Side  Right  -MS     Pain Location  hip  -MS     Pain Intervention(s)  Medication (See MAR);Repositioned;Ambulation/increased activity  -MS     Row Name 08/11/20 4182          Plan of Care Review    Plan of Care Reviewed With  patient;spouse  -MS     Progress  no change  -MS     Outcome Summary  PT evaluation completed. The patient presents alert and oriented x4. He presents with pain radiating down his R LE at rest in bed and with all mobility. He reported no change in his pain with mobility. He does have R hip flexion and R quad weakness that seems to be about the same as noted by the PT after surgery. He requires assist to reach full extension of his R knee in standing and blocking of his R knee to prevent buckling. He was able to transfer to a chair with 2 people assisting him. He will benefit from skilled PT services to work on strengthening, functional mobility, and pain control during mobility. Recommend discharge to inpt acute rehab.   -MS      Row Name 08/11/20 0740          Physical Therapy Clinical Impression    Patient/Family Goals Statement (PT Clinical Impression)  return to PLOF and decrease pain  -MS     Criteria for Skilled Interventions Met (PT Clinical Impression)  yes;treatment indicated  -MS     Rehab Potential (PT Clinical Summary)  good, to achieve stated therapy goals  -MS     Predicted Duration of Therapy (PT)  until discharge  -MS     Row Name 08/11/20 0740          Vital Signs    Pre SpO2 (%)  95  -MS     O2 Delivery Pre Treatment  supplemental O2 3L  -MS     O2 Delivery Intra Treatment  room air  -MS     Post SpO2 (%)  93  -MS     O2 Delivery Post Treatment  room air  -MS     Pre Patient Position  Supine  -MS     Intra Patient Position  Sitting  -MS     Post Patient Position  Sitting  -MS     Row Name 08/11/20 0740          Positioning and Restraints    Post Treatment Position  chair  -MS     In Chair  sitting;encouraged to call for assist;call light within reach;with family/caregiver;with nsg;with brace  -MS       User Key  (r) = Recorded By, (t) = Taken By, (c) = Cosigned By    Initials Name Provider Type    Sharon Craranza, PT, DPT, NCS Physical Therapist        Outcome Measures     Row Name 08/11/20 0740          How much help from another person do you currently need...    Turning from your back to your side while in flat bed without using bedrails?  1  -MS     Moving from lying on back to sitting on the side of a flat bed without bedrails?  1  -MS     Moving to and from a bed to a chair (including a wheelchair)?  2  -MS     Standing up from a chair using your arms (e.g., wheelchair, bedside chair)?  2  -MS     Climbing 3-5 steps with a railing?  1  -MS     To walk in hospital room?  1  -MS     AM-PAC 6 Clicks Score (PT)  8  -MS     Row Name 08/11/20 0740          Functional Assessment    Outcome Measure Options  AM-PAC 6 Clicks Basic Mobility (PT)  -MS       User Key  (r) = Recorded By, (t) = Taken By, (c) = Cosigned By     Initials Name Provider Type    Sharon Carranza R, PT, DPT, NCS Physical Therapist        Physical Therapy Education                 Title: PT OT SLP Therapies (In Progress)     Topic: Physical Therapy (In Progress)     Point: Mobility training (In Progress)     Description:   Instruct learner(s) on safety and technique for assisting patient out of bed, chair or wheelchair.  Instruct in the proper use of assistive devices, such as walker, crutches, cane or brace.              Patient Friendly Description:   It's important to get you on your feet again, but we need to do so in a way that is safe for you. Falling has serious consequences, and your personal safety is the most important thing of all.        When it's time to get out of bed, one of us or a family member will sit next to you on the bed to give you support.     If your doctor or nurse tells you to use a walker, crutches, a cane, or a brace, be sure you use it every time you get out of bed, even if you think you don't need it.    Learning Progress Summary           Patient Acceptance, E, NR by MS at 8/11/2020 1052    Comment:  role of PT in his care, safety for R knee buckling                   Point: Home exercise program (Not Started)     Description:   Instruct learner(s) on appropriate technique for monitoring, assisting and/or progressing patient with therapeutic exercises and activities.              Learner Progress:   Not documented in this visit.          Point: Body mechanics (Not Started)     Description:   Instruct learner(s) on proper positioning and spine alignment for patient and/or caregiver during mobility tasks and/or exercises.              Learner Progress:   Not documented in this visit.          Point: Precautions (Not Started)     Description:   Instruct learner(s) on prescribed precautions during mobility and gait tasks              Learner Progress:   Not documented in this visit.                      User Key     Initials  Effective Dates Name Provider Type Discipline    MS 06/19/18 -  Sharon Fay, PT, DPT, NCS Physical Therapist PT              PT Recommendation and Plan  Planned Therapy Interventions (PT Eval): balance training, bed mobility training, gait training, patient/family education, orthotic fitting/training, strengthening, ROM (range of motion), transfer training, motor coordination training, neuromuscular re-education  Outcome Summary/Treatment Plan (PT)  Anticipated Discharge Disposition (PT): inpatient rehabilitation facility  Plan of Care Reviewed With: patient, spouse  Progress: no change  Outcome Summary: PT evaluation completed. The patient presents alert and oriented x4. He presents with pain radiating down his R LE at rest in bed and with all mobility. He reported no change in his pain with mobility. He does have R hip flexion and R quad weakness that seems to be about the same as noted by the PT after surgery. He requires assist to reach full extension of his R knee in standing and blocking of his R knee to prevent buckling. He was able to transfer to a chair with 2 people assisting him. He will benefit from skilled PT services to work on strengthening, functional mobility, and pain control during mobility. Recommend discharge to inpt acute rehab.      Time Calculation:   PT Charges     Row Name 08/11/20 0740             Time Calculation    Start Time  0740 10 min chart review  -MS      Stop Time  0830  -MS      Time Calculation (min)  50 min  -MS      PT Received On  08/11/20  -MS      PT Goal Re-Cert Due Date  08/21/20  -MS        User Key  (r) = Recorded By, (t) = Taken By, (c) = Cosigned By    Initials Name Provider Type    MS Sharon Fay, PT, DPT, NCS Physical Therapist        Therapy Charges for Today     Code Description Service Date Service Provider Modifiers Qty    [id number]  PT EVAL MOD COMPLEXITY 4 8/11/2020 Sharon Fay, PT, DPT, NCS GP 1          PT G-Codes  Outcome Measure Options:  AM-PAC 6 Clicks Daily Activity (OT)  AM-PAC 6 Clicks Score (PT): 8  AM-PAC 6 Clicks Score (OT): 17    Sharon Fay, PT, DPT, NCS  8/11/2020

## 2020-08-12 PROBLEM — Z78.9 DECREASED ACTIVITIES OF DAILY LIVING (ADL): Status: ACTIVE | Noted: 2020-08-12

## 2020-08-12 LAB
ALBUMIN SERPL-MCNC: 3.4 G/DL (ref 3.5–5.2)
ALBUMIN/GLOB SERPL: 1.1 G/DL
ALP SERPL-CCNC: 39 U/L (ref 39–117)
ALT SERPL W P-5'-P-CCNC: 12 U/L (ref 1–41)
ANION GAP SERPL CALCULATED.3IONS-SCNC: 11 MMOL/L (ref 5–15)
AST SERPL-CCNC: 18 U/L (ref 1–40)
BASOPHILS # BLD AUTO: 0.08 10*3/MM3 (ref 0–0.2)
BASOPHILS NFR BLD AUTO: 0.8 % (ref 0–1.5)
BILIRUB SERPL-MCNC: 0.5 MG/DL (ref 0–1.2)
BUN SERPL-MCNC: 17 MG/DL (ref 8–23)
BUN/CREAT SERPL: 14.8 (ref 7–25)
CALCIUM SPEC-SCNC: 8.9 MG/DL (ref 8.6–10.5)
CHLORIDE SERPL-SCNC: 99 MMOL/L (ref 98–107)
CO2 SERPL-SCNC: 30 MMOL/L (ref 22–29)
CREAT SERPL-MCNC: 1.15 MG/DL (ref 0.76–1.27)
DEPRECATED RDW RBC AUTO: 43.7 FL (ref 37–54)
EOSINOPHIL # BLD AUTO: 0.62 10*3/MM3 (ref 0–0.4)
EOSINOPHIL NFR BLD AUTO: 5.8 % (ref 0.3–6.2)
ERYTHROCYTE [DISTWIDTH] IN BLOOD BY AUTOMATED COUNT: 12.7 % (ref 12.3–15.4)
FOLATE SERPL-MCNC: 6.92 NG/ML (ref 4.78–24.2)
GFR SERPL CREATININE-BSD FRML MDRD: 62 ML/MIN/1.73
GLOBULIN UR ELPH-MCNC: 3.2 GM/DL
GLUCOSE BLDC GLUCOMTR-MCNC: 155 MG/DL (ref 70–130)
GLUCOSE BLDC GLUCOMTR-MCNC: 168 MG/DL (ref 70–130)
GLUCOSE BLDC GLUCOMTR-MCNC: 179 MG/DL (ref 70–130)
GLUCOSE SERPL-MCNC: 116 MG/DL (ref 65–99)
HCT VFR BLD AUTO: 32.7 % (ref 37.5–51)
HGB BLD-MCNC: 10.6 G/DL (ref 13–17.7)
IMM GRANULOCYTES # BLD AUTO: 0.05 10*3/MM3 (ref 0–0.05)
IMM GRANULOCYTES NFR BLD AUTO: 0.5 % (ref 0–0.5)
INR PPP: 1.22 (ref 0.91–1.09)
IRON 24H UR-MRATE: 24 MCG/DL (ref 59–158)
IRON SATN MFR SERPL: 11 % (ref 20–50)
LYMPHOCYTES # BLD AUTO: 2.16 10*3/MM3 (ref 0.7–3.1)
LYMPHOCYTES NFR BLD AUTO: 20.3 % (ref 19.6–45.3)
MCH RBC QN AUTO: 30.6 PG (ref 26.6–33)
MCHC RBC AUTO-ENTMCNC: 32.4 G/DL (ref 31.5–35.7)
MCV RBC AUTO: 94.5 FL (ref 79–97)
MONOCYTES # BLD AUTO: 1.13 10*3/MM3 (ref 0.1–0.9)
MONOCYTES NFR BLD AUTO: 10.6 % (ref 5–12)
NEUTROPHILS NFR BLD AUTO: 6.62 10*3/MM3 (ref 1.7–7)
NEUTROPHILS NFR BLD AUTO: 62 % (ref 42.7–76)
NRBC BLD AUTO-RTO: 0 /100 WBC (ref 0–0.2)
PLATELET # BLD AUTO: 227 10*3/MM3 (ref 140–450)
PMV BLD AUTO: 10.4 FL (ref 6–12)
POTASSIUM SERPL-SCNC: 3.9 MMOL/L (ref 3.5–5.2)
PROT SERPL-MCNC: 6.6 G/DL (ref 6–8.5)
PROTHROMBIN TIME: 15 SECONDS (ref 11.9–14.6)
RBC # BLD AUTO: 3.46 10*6/MM3 (ref 4.14–5.8)
SODIUM SERPL-SCNC: 140 MMOL/L (ref 136–145)
TIBC SERPL-MCNC: 212 MCG/DL (ref 298–536)
TRANSFERRIN SERPL-MCNC: 142 MG/DL (ref 200–360)
TSH SERPL DL<=0.05 MIU/L-ACNC: 4.73 UIU/ML (ref 0.27–4.2)
VIT B12 BLD-MCNC: >2000 PG/ML (ref 211–946)
WBC # BLD AUTO: 10.66 10*3/MM3 (ref 3.4–10.8)

## 2020-08-12 PROCEDURE — 85610 PROTHROMBIN TIME: CPT | Performed by: FAMILY MEDICINE

## 2020-08-12 PROCEDURE — 63710000001 INSULIN LISPRO (HUMAN) PER 5 UNITS: Performed by: FAMILY MEDICINE

## 2020-08-12 PROCEDURE — 97535 SELF CARE MNGMENT TRAINING: CPT | Performed by: OCCUPATIONAL THERAPIST

## 2020-08-12 PROCEDURE — 97530 THERAPEUTIC ACTIVITIES: CPT

## 2020-08-12 PROCEDURE — 80053 COMPREHEN METABOLIC PANEL: CPT | Performed by: FAMILY MEDICINE

## 2020-08-12 PROCEDURE — 82746 ASSAY OF FOLIC ACID SERUM: CPT | Performed by: FAMILY MEDICINE

## 2020-08-12 PROCEDURE — 84443 ASSAY THYROID STIM HORMONE: CPT | Performed by: FAMILY MEDICINE

## 2020-08-12 PROCEDURE — 82607 VITAMIN B-12: CPT | Performed by: FAMILY MEDICINE

## 2020-08-12 PROCEDURE — 97116 GAIT TRAINING THERAPY: CPT

## 2020-08-12 PROCEDURE — 82962 GLUCOSE BLOOD TEST: CPT

## 2020-08-12 PROCEDURE — 85025 COMPLETE CBC W/AUTO DIFF WBC: CPT | Performed by: FAMILY MEDICINE

## 2020-08-12 PROCEDURE — 83540 ASSAY OF IRON: CPT | Performed by: FAMILY MEDICINE

## 2020-08-12 PROCEDURE — 84466 ASSAY OF TRANSFERRIN: CPT | Performed by: FAMILY MEDICINE

## 2020-08-12 RX ORDER — BISACODYL 10 MG
10 SUPPOSITORY, RECTAL RECTAL DAILY
Status: DISCONTINUED | OUTPATIENT
Start: 2020-08-12 | End: 2020-08-13 | Stop reason: HOSPADM

## 2020-08-12 RX ORDER — SULFAMETHOXAZOLE AND TRIMETHOPRIM 800; 160 MG/1; MG/1
1 TABLET ORAL EVERY 12 HOURS SCHEDULED
Status: DISCONTINUED | OUTPATIENT
Start: 2020-08-12 | End: 2020-08-13 | Stop reason: HOSPADM

## 2020-08-12 RX ORDER — WARFARIN SODIUM 5 MG/1
5 TABLET ORAL
Status: DISCONTINUED | OUTPATIENT
Start: 2020-08-13 | End: 2020-08-13

## 2020-08-12 RX ORDER — WARFARIN SODIUM 5 MG/1
10 TABLET ORAL
Status: COMPLETED | OUTPATIENT
Start: 2020-08-12 | End: 2020-08-12

## 2020-08-12 RX ADMIN — LEVOTHYROXINE SODIUM 175 MCG: 150 TABLET ORAL at 06:46

## 2020-08-12 RX ADMIN — INSULIN LISPRO 2 UNITS: 100 INJECTION, SOLUTION INTRAVENOUS; SUBCUTANEOUS at 17:07

## 2020-08-12 RX ADMIN — METOPROLOL SUCCINATE 100 MG: 100 TABLET, EXTENDED RELEASE ORAL at 21:28

## 2020-08-12 RX ADMIN — POLYETHYLENE GLYCOL (3350) 17 G: 17 POWDER, FOR SOLUTION ORAL at 08:23

## 2020-08-12 RX ADMIN — OXYCODONE HYDROCHLORIDE AND ACETAMINOPHEN 1 TABLET: 7.5; 325 TABLET ORAL at 21:28

## 2020-08-12 RX ADMIN — OXYCODONE HYDROCHLORIDE AND ACETAMINOPHEN 1 TABLET: 7.5; 325 TABLET ORAL at 15:33

## 2020-08-12 RX ADMIN — SULFAMETHOXAZOLE AND TRIMETHOPRIM 160 MG: 800; 160 TABLET ORAL at 21:28

## 2020-08-12 RX ADMIN — SULFAMETHOXAZOLE AND TRIMETHOPRIM 160 MG: 800; 160 TABLET ORAL at 10:36

## 2020-08-12 RX ADMIN — OXYCODONE HYDROCHLORIDE AND ACETAMINOPHEN 1 TABLET: 7.5; 325 TABLET ORAL at 06:51

## 2020-08-12 RX ADMIN — ATORVASTATIN CALCIUM 10 MG: 10 TABLET, FILM COATED ORAL at 21:28

## 2020-08-12 RX ADMIN — OXYCODONE HYDROCHLORIDE AND ACETAMINOPHEN 1 TABLET: 7.5; 325 TABLET ORAL at 11:07

## 2020-08-12 RX ADMIN — MONTELUKAST 10 MG: 10 TABLET ORAL at 21:28

## 2020-08-12 RX ADMIN — WARFARIN SODIUM 10 MG: 5 TABLET ORAL at 17:05

## 2020-08-12 RX ADMIN — GLIPIZIDE 5 MG: 5 TABLET ORAL at 08:22

## 2020-08-12 RX ADMIN — OXYCODONE HYDROCHLORIDE AND ACETAMINOPHEN 1 TABLET: 7.5; 325 TABLET ORAL at 00:54

## 2020-08-12 RX ADMIN — FUROSEMIDE 40 MG: 40 TABLET ORAL at 17:05

## 2020-08-12 RX ADMIN — ASPIRIN 81 MG 81 MG: 81 TABLET ORAL at 08:22

## 2020-08-12 RX ADMIN — FUROSEMIDE 40 MG: 40 TABLET ORAL at 08:22

## 2020-08-12 RX ADMIN — BISACODYL 10 MG: 10 SUPPOSITORY RECTAL at 10:36

## 2020-08-12 RX ADMIN — METOPROLOL SUCCINATE 100 MG: 100 TABLET, EXTENDED RELEASE ORAL at 08:22

## 2020-08-12 RX ADMIN — GUAIFENESIN 1200 MG: 600 TABLET, EXTENDED RELEASE ORAL at 08:22

## 2020-08-12 RX ADMIN — POTASSIUM CHLORIDE 20 MEQ: 1.5 POWDER, FOR SOLUTION ORAL at 08:22

## 2020-08-12 NOTE — THERAPY TREATMENT NOTE
Acute Care - Physical Therapy Treatment Note  [facility]     Patient Name: Shabbir Solorio  : 1945  MRN: [id number]  Today's Date: 2020  Onset of Illness/Injury or Date of Surgery: 08/10/20     Referring Physician: Dr. Cloud     Admit Date: 8/10/2020    Visit Dx:    ICD-10-CM ICD-9-CM   1. Decreased activities of daily living (ADL) Z78.9 V49.89   2. Impaired functional mobility, balance, gait, and endurance Z74.09 V49.89     Patient Active Problem List   Diagnosis   • Lumbar radiculopathy   • DDD (degenerative disc disease), lumbar   • Lumbar back pain   • Chronic atrial fibrillation (CMS/HCC)   • Long term current use of anticoagulant therapy   • Nonischemic cardiomyopathy (CMS/HCC)   • KEYONA (obstructive sleep apnea)   • S/P AVR (aortic valve replacement)   • LV dysfunction   • Type 2 diabetes mellitus (CMS/HCC)   • Essential hypertension   • Hyperlipidemia   • CKD (chronic kidney disease) stage 3, GFR 30-59 ml/min (CMS/HCC)   • Hypothyroidism (acquired)   • Decreased activities of daily living (ADL)       Therapy Treatment    Rehabilitation Treatment Summary     Row Name 20 1445 20 1300 20 0830       Treatment Time/Intention    Discipline  physical therapy assistant  -KJ  occupational therapist  -JJ  physical therapy assistant  -KJ    Document Type  therapy note (daily note)  -KJ  therapy note (daily note)  -JJ  therapy note (daily note)  -KJ    Subjective Information  no complaints  -KJ  complains of;pain  -JJ  complains of;pain  -KJ    Mode of Treatment  physical therapy  -KJ  occupational therapy  -JJ  physical therapy  -KJ    Patient/Family Observations  --  `  -JJ2  --    Therapy Frequency (OT Eval)  --  5 times/wk  -JJ  --    Patient Effort  good  -KJ  good  -JJ  good  -KJ    Existing Precautions/Restrictions  fall;brace worn when out of bed;spinal  -KJ  fall;brace worn when out of bed;spinal  -JJ  fall;brace worn when out of bed;spinal  -KJ    Treatment  Considerations/Comments  LSO  -KJ  LSO  -JJ  LSO  -KJ    Recorded by [KJ] Rama Babcock, PTA 08/12/20 1459 [JJ] Evette Garcia OTR/L 08/12/20 1412  [JJ2] Evette Garcia OTR/DEBORAH 08/12/20 1415 [KJ] Rama Babcock, PTA 08/12/20 1015    Row Name 08/12/20 1445 08/12/20 0830          Bed Mobility Assessment/Treatment    Sidelying-Sit [place] (Bed Mobility)  --  verbal cues;minimum assist (75% patient effort);moderate assist (50% patient effort)  -KJ     Sit-Sidelying [place] (Bed Mobility)  verbal cues;minimum assist (75% patient effort)  -KJ  --     Assistive Device (Bed Mobility)  bed rails  -KJ  bed rails  -KJ     Recorded by [KJ] Rama Babcock, PTA 08/12/20 1459 [KJ] Rama Babcock, PTA 08/12/20 1015     Row Name 08/12/20 1300             Transfer Assessment/Treatment    Transfer Assessment/Treatment  sit-stand transfer;stand-sit transfer;stand pivot/stand step transfer  -JJ      Comment (Transfers)  small steps from chair to shower chair   -JJ      Recorded by [JJ] Evette Garcia OTR/L 08/12/20 1412      Row Name 08/12/20 1445 08/12/20 1300 08/12/20 0830       Sit-Stand Transfer    Sit-Stand [place] (Transfers)  verbal cues;minimum assist (75% patient effort)  -KJ  verbal cues;minimum assist (75% patient effort)  -JJ  verbal cues;minimum assist (75% patient effort)  -KJ    Assistive Device (Sit-Stand Transfers)  walker, front-wheeled  -KJ  walker, front-wheeled  -JJ  walker, front-wheeled  -KJ    Recorded by [KJ] Rama Babcock, PTA 08/12/20 1459 [JJ] Evette Garcia OTR/DEBORAH 08/12/20 1412 [KJ] Rama Babcock, PTA 08/12/20 1015    Row Name 08/12/20 1445 08/12/20 1300 08/12/20 0830       Stand-Sit Transfer    Stand-Sit [place] (Transfers)  verbal cues;contact guard  -KJ  verbal cues;contact guard  -JJ  verbal cues;contact guard  -KJ    Assistive Device (Stand-Sit Transfers)  walker, front-wheeled  -KJ  walker, front-wheeled  -JJ  --    Recorded by [KJ] Rama Babcock, PTA  08/12/20 1459 [JJ] Evette Garcia OTR/DEBORAH 08/12/20 1412 [KJ] Rama Babcock, PTA 08/12/20 1015    Row Name 08/12/20 1300             Stand Pivot/Stand Step Transfer    Stand Pivot/Stand Step [place]  minimum assist (75% patient effort);verbal cues  -JJ      Assistive Device (Stand Pivot Stand Step Transfer)  walker, front-wheeled  -JJ      Recorded by [JJ] Evette Garcia OTR/L 08/12/20 1412      Row Name 08/12/20 1445 08/12/20 0830          Gait/Stairs Assessment/Training    Gait/Stairs Assessment/Training  gait/ambulation independence  -KJ  gait/ambulation independence  -KJ     [place] Level (Gait)  verbal cues;contact guard;minimum assist (75% patient effort);2 person assist 2 for safety due to R leg buckling  -KJ  verbal cues;contact guard;minimum assist (75% patient effort);2 person assist 2 for safety due to R leg buckling  -KJ     Assistive Device (Gait)  walker, front-wheeled  -KJ  walker, front-wheeled  -KJ     Distance in Feet (Gait)  steps back to bed  -KJ  10' x 2; to BR assisted in clean up  -KJ     Pattern (Gait)  step-through  -KJ  step-through  -KJ     Bilateral Gait Deviations  forward flexed posture  -KJ  forward flexed posture  -KJ     Recorded by [KJ] Rama Babcock, PTA 08/12/20 1459 [KJ] Rama Babcock, PTA 08/12/20 1015     Row Name 08/12/20 1300             ADL Assessment/Intervention    BADL Assessment/Intervention  bathing;upper body dressing;lower body dressing  -JJ      Recorded by [JJ] Evette Garcia OTR/L 08/12/20 1412      Row Name 08/12/20 1300             Bathing Assessment/Intervention    Bathing [place] Level  lower body;maximum assist (25% patient effort);upper body;chest/trunk;proximal lower extremities;set up;supervision  -JJ      Bathing Position  supported sitting in shower chair  -JJ      Recorded by [JJ] Evette Garcia OTR/L 08/12/20 1412      Row Name 08/12/20 1300             Upper Body Dressing Assessment/Training    Upper Body Dressing  [place] Level  don;doff;minimum assist (75% patient effort) gown; LSO  -JJ      Upper Body Dressing Position  unsupported sitting  -JJ      Recorded by [JJ] Evette Garcia OTR/L 08/12/20 1412      Row Name 08/12/20 1300             Lower Body Dressing Assessment/Training    Lower Body Dressing [place] Level  doff;don;socks;maximum assist (25% patient effort)  -JJ      Lower Body Dressing Position  unsupported sitting  -JJ      Recorded by [JJ] Evette Garcia OTR/L 08/12/20 1412      Row Name 08/12/20 0830             Therapeutic Exercise    Exercise Type (Therapeutic Exercise)  AROM (active range of motion)  -KJ      Position (Therapeutic Exercise)  seated  -KJ      Sets/Reps (Therapeutic Exercise)  15  -KJ      Recorded by [KJ] Rama Babcock, PTA 08/12/20 1015      Row Name 08/12/20 1445 08/12/20 1300 08/12/20 0830       Positioning and Restraints    Pre-Treatment Position  sitting in chair/recliner  -KJ  sitting in chair/recliner  -JJ  in bed  -KJ    Post Treatment Position  bed  -KJ  chair  -JJ  chair  -KJ    In Bed  call light within reach  -KJ  --  call light within reach  -KJ    In Chair  --  reclined;notified nsg;call light within reach;encouraged to call for assist;with brace  -JJ  --    Recorded by [KJ] Rama Babcock, PTA 08/12/20 1459 [JJ] Evette Garcia OTR/DEBORAH 08/12/20 1412 [KJ] Rama Babcock, PTA 08/12/20 1015    Row Name 08/12/20 1300             Pain Assessment    Additional Documentation  Pain Scale: FACES Pre/Post-Treatment (Group)  -JJ      Recorded by [JJ] Evette Garcia OTR/L 08/12/20 1412      Row Name 08/12/20 1445 08/12/20 1300          Pain Scale: Numbers Pre/Post-Treatment    Pain Scale: Numbers, Pretreatment  6/10  -KJ  --     Pain Scale: Numbers, Post-Treatment  7/10  -KJ  --     Pain Location - Side  Right  -KJ  Right  -JJ     Pain Location - Orientation  lower  -KJ  --     Pain Location  extremity  -KJ  hip  -JJ     Pain Intervention(s)  --   Repositioned;Ambulation/increased activity;Medication (See MAR)  -JJ     Recorded by [KJ] Rama Babcock, PTA 08/12/20 1459 [JJ] Evette Garcia OTR/L 08/12/20 1415     Row Name 08/12/20 1300             Pain Scale: FACES Pre/Post-Treatment    Pain: FACES Scale, Pretreatment  4-->hurts little more  -JJ      Pain: FACES Scale, Post-Treatment  4-->hurts little more  -JJ      Recorded by [JJ] Evette Garcia OTR/L 08/12/20 1415      Row Name 08/12/20 1300             Spinal Orthosis Management    Type (Spinal Orthosis)  LSO (lumbar sacral orthosis)  -JJ      Functional Design (Spinal Orthosis)  static orthosis  -JJ      Therapeutic Indications (Spinal Orthosis)  pain management;post-op positioning/protection  -JJ      Wearing Schedule (Spinal Orthosis)  wear when out of bed only  -JJ      Orthosis Training (Spinal Orthosis)  patient;all orthosis skills  -JJ      Recorded by [JJ] Evette Garcia OTR/L 08/12/20 1415      Row Name 08/12/20 1300             Plan of Care Review    Plan of Care Reviewed With  patient  -JJ      Outcome Summary  OT tx completed. Pt agreeable and requesting shower this date. Required Min A for all functional t/fs with rwx. Max A for LB dressing. Min A for UB dressing. Set-up and Supervision for UB bathing and Mod/Max A for LB bathing tasks. Pt fatigued with activity and required more assist at end of tx. Continue OT POC.   -JJ      Recorded by [JJ] Evette Garcia OTR/L 08/12/20 1415      Row Name 08/12/20 1300             Outcome Summary/Treatment Plan (OT)    Daily Summary of Progress (OT)  progress toward functional goals is good  -JJ      Plan for Continued Treatment (OT)  continue OT POC  -JJ      Anticipated Discharge Disposition (OT)  inpatient rehabilitation facility  -JJ      Recorded by [JREKHA] Evette Garcia OTR/L 08/12/20 1415        User Key  (r) = Recorded By, (t) = Taken By, (c) = Cosigned By    Initials Name Effective Dates Discipline    Rama Wynn,  PTA 08/02/16 -  PT    Larisa Hassannifer, OTR/L 07/05/20 -  OT                   Physical Therapy Education                 Title: PT OT SLP Therapies (In Progress)     Topic: Physical Therapy (In Progress)     Point: Mobility training (In Progress)     Description:   Instruct learner(s) on safety and technique for assisting patient out of bed, chair or wheelchair.  Instruct in the proper use of assistive devices, such as walker, crutches, cane or brace.              Patient Friendly Description:   It's important to get you on your feet again, but we need to do so in a way that is safe for you. Falling has serious consequences, and your personal safety is the most important thing of all.        When it's time to get out of bed, one of us or a family member will sit next to you on the bed to give you support.     If your doctor or nurse tells you to use a walker, crutches, a cane, or a brace, be sure you use it every time you get out of bed, even if you think you don't need it.    Learning Progress Summary           Patient Acceptance, E, NR by MS at 8/11/2020 1056    Comment:  role of PT in his care, safety for R knee buckling                   Point: Home exercise program (Not Started)     Description:   Instruct learner(s) on appropriate technique for monitoring, assisting and/or progressing patient with therapeutic exercises and activities.              Learner Progress:   Not documented in this visit.          Point: Body mechanics (Not Started)     Description:   Instruct learner(s) on proper positioning and spine alignment for patient and/or caregiver during mobility tasks and/or exercises.              Learner Progress:   Not documented in this visit.          Point: Precautions (Not Started)     Description:   Instruct learner(s) on prescribed precautions during mobility and gait tasks              Learner Progress:   Not documented in this visit.                      User Key     Initials Effective Dates  Name Provider Type Discipline    MS 06/19/18 -  Sharon Fay, PT, DPT, NCS Physical Therapist PT                PT Recommendation and Plan     Plan of Care Reviewed With: patient  Progress: improving  Outcome Summary: PT tx completed. Pt supine in bed. Rates back pn at rest 3/10.  Increased to 10/10 with mobility. Bed mobility Min/ModA, sit<>stand Ana, amb 15' x 2 with r wx Ana 1-2 for safety due to RLE buckling. Recommend SNF for cont'd PT/OT for strengthening.  Outcome Measures     Row Name 08/12/20 1400 08/11/20 0758          How much help from another is currently needed...    Putting on and taking off regular lower body clothing?  2  -JJ  2  -CH     Bathing (including washing, rinsing, and drying)  2  -JJ  2  -CH     Toileting (which includes using toilet bed pan or urinal)  2  -JJ  2  -CH     Putting on and taking off regular upper body clothing  3  -JJ  3  -CH     Taking care of personal grooming (such as brushing teeth)  4  -JJ  4  -CH     Eating meals  4  -J  4  -CH     AM-PAC 6 Clicks Score (OT)  17  -JJ  17  -CH        Functional Assessment    Outcome Measure Options  AM-PAC 6 Clicks Daily Activity (OT)  -JJ  AM-PAC 6 Clicks Daily Activity (OT)  -CH       User Key  (r) = Recorded By, (t) = Taken By, (c) = Cosigned By    Initials Name Provider Type    CH Yancy Gordon, OTR/L Occupational Therapist    Evette Velez, OTR/L Occupational Therapist         Time Calculation:   PT Charges     Row Name 08/12/20 1459 08/12/20 1015          Time Calculation    Start Time  1445  -KJ  0830  -KJ     Stop Time  1459  -KJ  0856  -KJ     Time Calculation (min)  14 min  -KJ  26 min  -KJ     PT Received On  --  08/12/20  -KJ     PT Goal Re-Cert Due Date  --  08/21/20  -KJ        Time Calculation- PT    Total Timed Code Minutes- PT  14 minute(s)  -KJ  26 minute(s)  -KJ       User Key  (r) = Recorded By, (t) = Taken By, (c) = Cosigned By    Initials Name Provider Type    Rama Wynn, PTA Physical  Therapy Assistant        Therapy Charges for Today     Code Description Service Date Service Provider Modifiers Qty    12600976360  PT THER PROC EA 15 MIN 8/11/2020 Rama Babcock, PTA GP 1    45897012996 HC PT THERAPEUTIC ACT EA 15 MIN 8/11/2020 Rama Babcock, PTA GP 2    30130049393 HC GAIT TRAINING EA 15 MIN 8/12/2020 Rama Babcock, PTA GP 2    [id number]  PT THERAPEUTIC ACT EA 15 MIN 8/12/2020 Rama Babcock, PTA GP 1          PT G-Codes  Outcome Measure Options: AM-PAC 6 Clicks Daily Activity (OT)  AM-PAC 6 Clicks Score (PT): 8  AM-PAC 6 Clicks Score (OT): 17    Rama Babcock PTA  8/12/2020

## 2020-08-12 NOTE — PLAN OF CARE
Problem: Patient Care Overview  Goal: Plan of Care Review  Outcome: Ongoing (interventions implemented as appropriate)  Flowsheets (Taken 8/12/2020 1514)  Progress: improving  Plan of Care Reviewed With: patient  Outcome Summary: A&Ox4. C/o pain. Prn pain medication given with good relief. Incision is CDI. PPP. Reports n/t in bilat feet states this is baseline. Also, reports occasional n/t in RLE. LSO when out of bed. BM today. Blood sugar monitored. Up in chair for few hours this shift. Call light within reach. Safety maintained. Will continue to monitor.

## 2020-08-12 NOTE — PROGRESS NOTES
Orthopedic Spine Progress Note    Shabbir Solorio  75 y.o.  male  Subjective       Systemic or Specific Complaints:     No complaints at this time.  Pain well controlled with medication.  Patient states that he continues to be relatively pain-free in bed but does have significant burning throughout right lower extremity upon ambulating.  He states that it is already is improved from his first postop day.    Objective     Vital signs in last 24 hours:  Temp:  [98 °F (36.7 °C)-98.8 °F (37.1 °C)] 98.8 °F (37.1 °C)  Heart Rate:  [80-96] 84  Resp:  [16-18] 16  BP: (126-137)/(68-82) 133/68    Physical Exam:    Alert and oriented ×3, no acute distress, grossly neurovascularly intact, vital signs stable, dressing clean dry and intact, moving all extremities with mild weakness to right hip flexor      Diagnostic Data:  CBC:  Results from last 7 days   Lab Units 08/12/20  0536   WBC 10*3/mm3 10.66   RBC 10*6/mm3 3.46*   HEMOGLOBIN g/dL 10.6*   HEMATOCRIT % 32.7*   PLATELETS 10*3/mm3 227          Assessment/Plan     1. Back pain.  2. Severe right anterior thigh and leg radiculopathy.  3. Multilevel lumbar degenerative disk disease.  4. Multilevel lumbar facet arthropathy.  5. Large foraminal disk herniation , right L3-4.  6. Severe central and right-sided foraminal stenosis, L3-4.  7. Atrial fibrillation, on Coumadin.  8. History of bladder cancer.  9.  Status post right L3-4 hemilaminectomy decompression with complete facetectomy, neuroforaminotomy, discectomy,  TLIF L3-4, PSF with instrumentation L3-4, 8/7/2020    Plan:  1. PT/OT/Brace  2. Periops  3. Probably [place] tomorrow      Shamar Hernandez PA-C    Date: 8/12/2020  Time: 15:57

## 2020-08-12 NOTE — PROGRESS NOTES
Shabbir Solorio is a 75 y.o. male patient.  Still no bowel movement.  More alert today.  Working on extension flexion of lower extremities.        Current Facility-Administered Medications   Medication Dose Route Frequency Provider Last Rate Last Dose   • acetaminophen (TYLENOL) tablet 650 mg  650 mg Oral Q6H PRN Anish Finney MD       • aspirin chewable tablet 81 mg  81 mg Oral Daily Howard Aguilar MD   81 mg at 08/11/20 0851   • atorvastatin (LIPITOR) tablet 10 mg  10 mg Oral Nightly Howard Aguilar MD   10 mg at 08/11/20 2058   • bisacodyl (DULCOLAX) suppository 10 mg  10 mg Rectal Daily Howard Aguilar MD       • dextrose (D50W) 25 g/ 50mL Intravenous Solution 25 g  25 g Intravenous Q15 Min PRN Howard Aguilar MD       • dextrose (GLUTOSE) oral gel 15 g  15 g Oral Q15 Min PRN Howard Aguilar MD       • doxycycline (ADOXA) tablet 100 mg  100 mg Oral Q12H Howard Aguilar MD   100 mg at 08/11/20 2058   • furosemide (LASIX) tablet 40 mg  40 mg Oral BID MKII Cloud MD   40 mg at 08/11/20 1746   • glipizide (GLUCOTROL) tablet 5 mg  5 mg Oral Daily MIKI Cloud MD   5 mg at 08/11/20 0849   • glucagon (human recombinant) (GLUCAGEN DIAGNOSTIC) injection 1 mg  1 mg Subcutaneous Q15 Min PRN Howard Aguilar MD       • guaiFENesin (MUCINEX) 12 hr tablet 1,200 mg  1,200 mg Oral Daily Howard Aguilar MD   1,200 mg at 08/11/20 0849   • HYDROmorphone (DILAUDID) injection 0.5 mg  0.5 mg Intravenous Q2H PRN Anish Finney MD   0.5 mg at 08/11/20 0525   • insulin lispro (humaLOG) injection 2-7 Units  2-7 Units Subcutaneous TID AC Howard Aguilar MD       • levothyroxine (SYNTHROID, LEVOTHROID) tablet 175 mcg  175 mcg Oral Q AM Howard Aguilar MD   175 mcg at 08/12/20 0646   • magnesium citrate solution 296 mL  296 mL Oral Daily PRN Howard Aguilar MD       • metoprolol succinate XL (TOPROL-XL) 24 hr tablet 100 mg  100 mg Oral Q12H  "MIKI Cloud MD   100 mg at 08/11/20 2058   • montelukast (SINGULAIR) tablet 10 mg  10 mg Oral Nightly Howard Aguilar MD   10 mg at 08/11/20 2058   • ondansetron (ZOFRAN) injection 4 mg  4 mg Intravenous Q6H PRN Anish Finney MD       • oxyCODONE-acetaminophen (PERCOCET) 7.5-325 MG per tablet 1 tablet  1 tablet Oral Q4H PRN Anish Finney MD   1 tablet at 08/12/20 0651   • oxyCODONE-acetaminophen (PERCOCET) 7.5-325 MG per tablet 1 tablet  1 tablet Oral Q4H PRN Howard Aguilar MD   1 tablet at 08/11/20 1315   • polyethylene glycol 3350 powder (packet)  17 g Oral BID Howard Aguilar MD   17 g at 08/11/20 2059   • potassium chloride (KLOR-CON) packet 20 mEq  20 mEq Oral Daily Howard Aguilar MD   20 mEq at 08/11/20 0848   • warfarin (COUMADIN) tablet 10 mg  10 mg Oral Daily Howard Aguilar MD         ALLERGIES:    Allergies   Allergen Reactions   • Advair Diskus [Fluticasone-Salmeterol] Other (See Comments)     Cannot tolerate inhaling the medication   • Penicillins Other (See Comments)     Does not remember         Lumbar radiculopathy    Chronic atrial fibrillation (CMS/HCC)    Long term current use of anticoagulant therapy    KEYONA (obstructive sleep apnea)    S/P AVR (aortic valve replacement)    Type 2 diabetes mellitus (CMS/[facility])    Essential hypertension    Hyperlipidemia    CKD (chronic kidney disease) stage 3, GFR 30-59 ml/min (CMS/[facility])    Hypothyroidism (acquired)    Blood pressure 130/82, pulse 80, temperature 98 °F (36.7 °C), temperature source Oral, resp. rate 16, height 210.8 cm (83\"), weight (!) 141 kg (310 lb), SpO2 94 %.      Subjective:  Symptoms:  Stable.    Diet:  Adequate intake.    Activity level: Impaired due to weakness.    Pain:  He complains of pain that is mild.  He reports pain is improving.  Pain is well controlled.      Review of Systems  Objective:  General Appearance:  Comfortable and well-appearing.    Vital signs: (most recent): Blood " "pressure 130/82, pulse 80, temperature 98 °F (36.7 °C), temperature source Oral, resp. rate 16, height 210.8 cm (83\"), weight (!) 141 kg (310 lb), SpO2 94 %.  Vital signs are normal.    Output: Producing urine and no stool output.    HEENT: Normal HEENT exam.    Lungs:  Normal effort and normal respiratory rate.    Heart: Normal rate.  Positive for murmur.  (Mechanical heart valve)  Abdomen: Abdomen is soft and distended.  Hypoactive bowel sounds.     Extremities: Decreased range of motion.    Neurological: Patient is alert and oriented to person, place and time.              Labs:  Lab Results (last 72 hours)     Procedure Component Value Units Date/Time    Protime-INR [[id number]]  (Abnormal) Collected:  08/12/20 0648    Specimen:  Blood Updated:  08/12/20 0702     Protime 15.0 Seconds      INR 1.22    Iron Profile [[id number]]  (Abnormal) Collected:  08/12/20 0536    Specimen:  Blood Updated:  08/12/20 0645     Iron 24 mcg/dL      Iron Saturation 11 %      Transferrin 142 mg/dL      TIBC 212 mcg/dL     Comprehensive Metabolic Panel [[id number]]  (Abnormal) Collected:  08/12/20 0536    Specimen:  Blood Updated:  08/12/20 0628     Glucose 116 mg/dL      BUN 17 mg/dL      Creatinine 1.15 mg/dL      Sodium 140 mmol/L      Potassium 3.9 mmol/L      Chloride 99 mmol/L      CO2 30.0 mmol/L      Calcium 8.9 mg/dL      Total Protein 6.6 g/dL      Albumin 3.40 g/dL      ALT (SGPT) 12 U/L      AST (SGOT) 18 U/L      Alkaline Phosphatase 39 U/L      Total Bilirubin 0.5 mg/dL      eGFR Non African Amer 62 mL/min/1.73      Globulin 3.2 gm/dL      A/G Ratio 1.1 g/dL      BUN/Creatinine Ratio 14.8     Anion Gap 11.0 mmol/L     Narrative:       GFR Normal >60  Chronic Kidney Disease <60  Kidney Failure <15      TSH [[id number]]  (Abnormal) Collected:  08/12/20 0536    Specimen:  Blood Updated:  08/12/20 0628     TSH 4.730 uIU/mL     CBC & Differential [[id number]] Collected:  08/12/20 0536    Specimen:  Blood Updated:  08/12/20 0601   "    Narrative:       The following orders were created for panel order CBC & Differential.  Procedure                               Abnormality         Status                     ---------                               -----------         ------                     CBC Auto Differential[[id number]]        Abnormal            Final result                 Please view results for these tests on the individual orders.    CBC Auto Differential [[id number]]  (Abnormal) Collected:  08/12/20 0536    Specimen:  Blood Updated:  08/12/20 0601     WBC 10.66 10*3/mm3      RBC 3.46 10*6/mm3      Hemoglobin 10.6 g/dL      Hematocrit 32.7 %      MCV 94.5 fL      MCH 30.6 pg      MCHC 32.4 g/dL      RDW 12.7 %      RDW-SD 43.7 fl      MPV 10.4 fL      Platelets 227 10*3/mm3      Neutrophil % 62.0 %      Lymphocyte % 20.3 %      Monocyte % 10.6 %      Eosinophil % 5.8 %      Basophil % 0.8 %      Immature Grans % 0.5 %      Neutrophils, Absolute 6.62 10*3/mm3      Lymphocytes, Absolute 2.16 10*3/mm3      Monocytes, Absolute 1.13 10*3/mm3      Eosinophils, Absolute 0.62 10*3/mm3      Basophils, Absolute 0.08 10*3/mm3      Immature Grans, Absolute 0.05 10*3/mm3      nRBC 0.0 /100 WBC     Vitamin B12 [[id number]] Collected:  08/12/20 0536    Specimen:  Blood Updated:  08/12/20 0559    Folate [[id number]] Collected:  08/12/20 0536    Specimen:  Blood Updated:  08/12/20 0559    POC Glucose Once [[id number]]  (Normal) Collected:  08/11/20 2050    Specimen:  Blood Updated:  08/11/20 2111     Glucose 112 mg/dL      Comment: : [id number] [place] TamaraMeter ID: [id number]       POC Glucose Once [[id number]]  (Normal) Collected:  08/11/20 1644    Specimen:  Blood Updated:  08/11/20 1655     Glucose 108 mg/dL      Comment: : [id number] Luba CrisostomoMeter ID: [id number]       POC Glucose Once [[id number]]  (Normal) Collected:  08/11/20 1312    Specimen:  Blood Updated:  08/11/20 1325     Glucose 130 mg/dL      Comment: : [id number] Luba  GraceMeter ID: [id number]       POC Glucose Once [[id number]]  (Abnormal) Collected:  08/11/20 1041    Specimen:  Blood Updated:  08/11/20 1118     Glucose 144 mg/dL      Comment: : [id number] Luba GraceMeter ID: [id number]       Urinalysis, Microscopic Only - Urine, Clean Catch [[id number]]  (Abnormal) Collected:  08/11/20 0847    Specimen:  Urine, Clean Catch Updated:  08/11/20 0918     RBC, UA 3-5 /HPF      WBC, UA 21-30 /HPF      Bacteria, UA Trace /HPF      Squamous Epithelial Cells, UA 0-2 /HPF      Hyaline Casts, UA None Seen /LPF      Methodology Manual Light Microscopy    Urinalysis With Microscopic If Indicated (No Culture) - Urine, Clean Catch [[id number]]  (Abnormal) Collected:  08/11/20 0847    Specimen:  Urine, Clean Catch Updated:  08/11/20 0915     Color, UA Yellow     Appearance, UA Cloudy     pH, UA 6.0     Specific Gravity, UA >=1.030     Glucose, UA Negative     Ketones, UA 40 mg/dL (2+)     Bilirubin, UA Negative     Blood, UA Moderate (2+)     Protein, UA 30 mg/dL (1+)     Leuk Esterase, UA Trace     Nitrite, UA Negative     Urobilinogen, UA 1.0 E.U./dL    POC Glucose Once [[id number]]  (Abnormal) Collected:  08/11/20 0844    Specimen:  Blood Updated:  08/11/20 0857     Glucose 157 mg/dL      Comment: : [id number] Luba GraceMeter ID: [id number]             Imaging Results (Last 72 Hours)     ** No results found for the last 72 hours. **                Assessment:    Condition: In stable condition.  Improving.   (    Type 2 diabetes- restart Glucophage tomorrow as of the 48 hours after IV dye.  Continue with Accu-Chek sliding scale insulin.    Constipation/obstipation-MiraLAX twice daily.  1 bottle mag citrate.  Will stimulate from below with Dulcolax suppository.  May need to repeat mag citrate if no results.    Mechanical heart valve-INR goal 2-3.  Will increase Coumadin from 5 to 10 mg today.    Extended conversation with wife at bedside today.  Plan for him to shower/shaved today, out  of bed today.  Stimulate bowel movement today and plan to discharge to rehab tomorrow.  All in agreement with treatment plan.    Urinary tract infection-MRSA.  Resistant to tetracycline therefore discontinue doxycycline and place him on Septra DS 1 p.o. twice daily x10 days.).     Problem List:     Lumbar radiculopathy    Chronic atrial fibrillation (CMS/HCC)    Long term current use of anticoagulant therapy    KEYONA (obstructive sleep apnea)    S/P AVR (aortic valve replacement)    Type 2 diabetes mellitus (CMS/HCC)    Essential hypertension    Hyperlipidemia    CKD (chronic kidney disease) stage 3, GFR 30-59 ml/min (CMS/HCC)    Hypothyroidism (acquired)    Howard Aguilar MD  8/12/2020

## 2020-08-12 NOTE — THERAPY TREATMENT NOTE
Acute Care - Physical Therapy Treatment Note  [facility]     Patient Name: Shabbir Solorio  : 1945  MRN: [id number]  Today's Date: 2020  Onset of Illness/Injury or Date of Surgery: 08/10/20     Referring Physician: Dr. Cloud     Admit Date: 8/10/2020    Visit Dx:    ICD-10-CM ICD-9-CM   1. Decreased activities of daily living (ADL) Z78.9 V49.89   2. Impaired functional mobility, balance, gait, and endurance Z74.09 V49.89     Patient Active Problem List   Diagnosis   • Lumbar radiculopathy   • DDD (degenerative disc disease), lumbar   • Lumbar back pain   • Chronic atrial fibrillation (CMS/HCC)   • Long term current use of anticoagulant therapy   • Nonischemic cardiomyopathy (CMS/HCC)   • KEYONA (obstructive sleep apnea)   • S/P AVR (aortic valve replacement)   • LV dysfunction   • Type 2 diabetes mellitus (CMS/HCC)   • Essential hypertension   • Hyperlipidemia   • CKD (chronic kidney disease) stage 3, GFR 30-59 ml/min (CMS/[facility])   • Hypothyroidism (acquired)   • Decreased activities of daily living (ADL)       Therapy Treatment    Rehabilitation Treatment Summary     Row Name 20             Treatment Time/Intention    Discipline  physical therapy assistant  -KJ      Document Type  therapy note (daily note)  -KJ      Subjective Information  complains of;pain  -KJ      Mode of Treatment  physical therapy  -KJ      Patient Effort  good  -KJ      Existing Precautions/Restrictions  fall;brace worn when out of bed;spinal  -KJ      Treatment Considerations/Comments  LSO  -KJ      Recorded by [KJ] Rama Babcock PTA 20 1015      Row Name 20             Bed Mobility Assessment/Treatment    Sidelying-Sit [place] (Bed Mobility)  verbal cues;minimum assist (75% patient effort);moderate assist (50% patient effort)  -KJ      Assistive Device (Bed Mobility)  bed rails  -KJ      Recorded by [KJ] Rama Babcock PTA 20 1015      Row Name 20             Sit-Stand  Transfer    Sit-Stand [place] (Transfers)  verbal cues;minimum assist (75% patient effort)  -KJ      Assistive Device (Sit-Stand Transfers)  walker, front-wheeled  -KJ      Recorded by [KJ] Rama Babcock PTA 08/12/20 1015      Row Name 08/12/20 0830             Stand-Sit Transfer    Stand-Sit [place] (Transfers)  verbal cues;contact guard  -KJ      Recorded by [KJ] Rama Babcock PTA 08/12/20 1015      Row Name 08/12/20 0830             Gait/Stairs Assessment/Training    Gait/Stairs Assessment/Training  gait/ambulation independence  -KJ      [place] Level (Gait)  verbal cues;contact guard;minimum assist (75% patient effort);2 person assist 2 for safety due to R leg buckling  -KJ      Assistive Device (Gait)  walker, front-wheeled  -KJ      Distance in Feet (Gait)  10' x 2; to BR assisted in clean up  -KJ      Pattern (Gait)  step-through  -KJ      Bilateral Gait Deviations  forward flexed posture  -KJ      Recorded by [KJ] Rama Babcock PTA 08/12/20 1015      Row Name 08/12/20 0830             Therapeutic Exercise    Exercise Type (Therapeutic Exercise)  AROM (active range of motion)  -KJ      Position (Therapeutic Exercise)  seated  -KJ      Sets/Reps (Therapeutic Exercise)  15  -KJ      Recorded by [KJ] Rama Babcock PTA 08/12/20 1015      Row Name 08/12/20 0830             Positioning and Restraints    Pre-Treatment Position  in bed  -KJ      Post Treatment Position  chair  -KJ      In Bed  call light within reach  -KJ      Recorded by [KJ] Rama Babcock PTA 08/12/20 1015        User Key  (r) = Recorded By, (t) = Taken By, (c) = Cosigned By    Initials Name Effective Dates Discipline    KJ Rama Babcock PTA 08/02/16 -  PT                   Physical Therapy Education                 Title: PT OT SLP Therapies (In Progress)     Topic: Physical Therapy (In Progress)     Point: Mobility training (In Progress)     Description:   Instruct learner(s) on safety and technique for assisting  patient out of bed, chair or wheelchair.  Instruct in the proper use of assistive devices, such as walker, crutches, cane or brace.              Patient Friendly Description:   It's important to get you on your feet again, but we need to do so in a way that is safe for you. Falling has serious consequences, and your personal safety is the most important thing of all.        When it's time to get out of bed, one of us or a family member will sit next to you on the bed to give you support.     If your doctor or nurse tells you to use a walker, crutches, a cane, or a brace, be sure you use it every time you get out of bed, even if you think you don't need it.    Learning Progress Summary           Patient Acceptance, E, NR by MS at 8/11/2020 2504    Comment:  role of PT in his care, safety for R knee buckling                   Point: Home exercise program (Not Started)     Description:   Instruct learner(s) on appropriate technique for monitoring, assisting and/or progressing patient with therapeutic exercises and activities.              Learner Progress:   Not documented in this visit.          Point: Body mechanics (Not Started)     Description:   Instruct learner(s) on proper positioning and spine alignment for patient and/or caregiver during mobility tasks and/or exercises.              Learner Progress:   Not documented in this visit.          Point: Precautions (Not Started)     Description:   Instruct learner(s) on prescribed precautions during mobility and gait tasks              Learner Progress:   Not documented in this visit.                      User Key     Initials Effective Dates Name Provider Type Discipline    MS 06/19/18 -  Sharon Fay, PT, DPT, NCS Physical Therapist PT                PT Recommendation and Plan     Plan of Care Reviewed With: patient  Progress: improving  Outcome Summary: PT tx completed. Pt supine in bed. Rates back pn at rest 3/10.  Increased to 10/10 with mobility. Bed  mobility Min/ModA, sit<>stand Ana, amb 15' x 2 with r wx Ana 1-2 for safety due to RLE buckling. Recommend SNF for cont'd PT/OT for strengthening.  Outcome Measures     Row Name 08/11/20 0758             How much help from another is currently needed...    Putting on and taking off regular lower body clothing?  2  -CH      Bathing (including washing, rinsing, and drying)  2  -CH      Toileting (which includes using toilet bed pan or urinal)  2  -CH      Putting on and taking off regular upper body clothing  3  -CH      Taking care of personal grooming (such as brushing teeth)  4  -CH      Eating meals  4  -CH      AM-PAC 6 Clicks Score (OT)  17  -CH         Functional Assessment    Outcome Measure Options  AM-PAC 6 Clicks Daily Activity (OT)  -CH        User Key  (r) = Recorded By, (t) = Taken By, (c) = Cosigned By    Initials Name Provider Type     Yancy Gordon, OTR/L Occupational Therapist         Time Calculation:   PT Charges     Row Name 08/12/20 1015             Time Calculation    Start Time  0830  -KJ      Stop Time  0856  -KJ      Time Calculation (min)  26 min  -KJ      PT Received On  08/12/20  -KJ      PT Goal Re-Cert Due Date  08/21/20  -KJ         Time Calculation- PT    Total Timed Code Minutes- PT  26 minute(s)  -KJ        User Key  (r) = Recorded By, (t) = Taken By, (c) = Cosigned By    Initials Name Provider Type    Rama Wynn, JAREN Physical Therapy Assistant        Therapy Charges for Today     Code Description Service Date Service Provider Modifiers Qty    13232075565 HC PT THER PROC EA 15 MIN 8/11/2020 Rama Babcock, PTA GP 1    75877154601 HC PT THERAPEUTIC ACT EA 15 MIN 8/11/2020 Rama Babcock, PTA GP 2    27176917234 HC GAIT TRAINING EA 15 MIN 8/12/2020 Rama Babcock, PTA GP 2          PT G-Codes  Outcome Measure Options: AM-PAC 6 Clicks Daily Activity (OT)  AM-PAC 6 Clicks Score (PT): 8  AM-PAC 6 Clicks Score (OT): 17    Rama Babcock PTA  8/12/2020

## 2020-08-12 NOTE — PLAN OF CARE
Problem: Patient Care Overview  Goal: Plan of Care Review  Flowsheets  Taken 8/12/2020 1417  Plan of Care Reviewed With: patient  Taken 8/12/2020 1300  Outcome Summary: OT tx completed. Pt agreeable and requesting shower this date. Required Min A for all functional t/fs with rwx. Max A for LB dressing. Min A for UB dressing. Set-up and Supervision for UB bathing and Mod/Max A for LB bathing tasks. Pt fatigued with activity and required more assist at end of tx. Continue OT POC.

## 2020-08-12 NOTE — THERAPY TREATMENT NOTE
Acute Care - Occupational Therapy Treatment Note  [facility]     Patient Name: Shabbir Solorio  : 1945  MRN: [id number]  Today's Date: 2020  Onset of Illness/Injury or Date of Surgery: 08/10/20  Date of Referral to OT: 08/10/20  Referring Physician: Dr. Cloud     Admit Date: 8/10/2020       ICD-10-CM ICD-9-CM   1. Decreased activities of daily living (ADL) Z78.9 V49.89   2. Impaired functional mobility, balance, gait, and endurance Z74.09 V49.89     Patient Active Problem List   Diagnosis   • Lumbar radiculopathy   • DDD (degenerative disc disease), lumbar   • Lumbar back pain   • Chronic atrial fibrillation (CMS/HCC)   • Long term current use of anticoagulant therapy   • Nonischemic cardiomyopathy (CMS/HCC)   • KEYONA (obstructive sleep apnea)   • S/P AVR (aortic valve replacement)   • LV dysfunction   • Type 2 diabetes mellitus (CMS/HCC)   • Essential hypertension   • Hyperlipidemia   • CKD (chronic kidney disease) stage 3, GFR 30-59 ml/min (CMS/HCC)   • Hypothyroidism (acquired)   • Decreased activities of daily living (ADL)     Past Medical History:   Diagnosis Date   • Arthritis    • Atrial fibrillation (CMS/HCC)    • Back pain    • Cancer (CMS/HCC)     bladder   • DDD (degenerative disc disease), lumbar 2020   • Diabetes mellitus (CMS/HCC)    • Disease of thyroid gland    • Function kidney decreased    • Heart valve replaced    • Hyperlipidemia    • Hypertension    • Lumbar back pain 2020   • Lumbar radiculopathy 2020   • Neuropathy    • Sleep apnea      Past Surgical History:   Procedure Laterality Date   • CARDIAC VALVE REPLACEMENT     • CYSTOSCOPY BLADDER BIOPSY     • LUMBAR LAMINECTOMY WITH FUSION Right 2020    Procedure: RIGHT L3-4, HEMILAMINECTOMY, FACETECTOMY, DISCECTOMY, TRANSFORAMINAL LUMBAR INTERBODY FUSION, POSTERIOR SPINAL FUSION WITH INSTRUMENTATION.;  Surgeon: MIKI Cloud MD;  Location: [facility];  Service: Orthopedic Spine;  Laterality: Right;   • VASECTOMY          Therapy Treatment    Rehabilitation Treatment Summary     Row Name 08/12/20 1300 08/12/20 0830          Treatment Time/Intention    Discipline  occupational therapist  -JREKHA  physical therapy assistant  -RAVINDRA     Document Type  therapy note (daily note)  -ANKIT  therapy note (daily note)  -KJ     Subjective Information  complains of;pain  -JJ  complains of;pain  -KJ     Mode of Treatment  occupational therapy  -JREKHA  physical therapy  -KJ     Patient/Family Observations  `  -JJ2  --     Therapy Frequency (OT Eval)  5 times/wk  -JJ  --     Patient Effort  good  -JJ  good  -KJ     Existing Precautions/Restrictions  fall;brace worn when out of bed;spinal  -JJ  fall;brace worn when out of bed;spinal  -KJ     Treatment Considerations/Comments  LSO  -JJ  LSO  -KJ     Recorded by [JJ] Evette Garcia OTR/L 08/12/20 1412  [JJ2] Evette Garcia OTR/DEBORAH 08/12/20 1415 [KJ] Rama Babcock, PTA 08/12/20 1015     Row Name 08/12/20 0830             Bed Mobility Assessment/Treatment    Sidelying-Sit [place] (Bed Mobility)  verbal cues;minimum assist (75% patient effort);moderate assist (50% patient effort)  -KJ      Assistive Device (Bed Mobility)  bed rails  -KJ      Recorded by [KJ] Rama Babcock, PTA 08/12/20 1015      Row Name 08/12/20 1300             Transfer Assessment/Treatment    Transfer Assessment/Treatment  sit-stand transfer;stand-sit transfer;stand pivot/stand step transfer  -J      Comment (Transfers)  small steps from chair to shower chair   -J      Recorded by [JJ] Evette Garcia OTR/L 08/12/20 1412      Row Name 08/12/20 1300 08/12/20 0830          Sit-Stand Transfer    Sit-Stand [place] (Transfers)  verbal cues;minimum assist (75% patient effort)  -JJ  verbal cues;minimum assist (75% patient effort)  -KJ     Assistive Device (Sit-Stand Transfers)  walker, front-wheeled  -JJ  walker, front-wheeled  -KJ     Recorded by [JJ] Evette Garcia OTR/L 08/12/20 1412 [KJ] Rama Babcock, PTA  08/12/20 1015     Row Name 08/12/20 1300 08/12/20 0830          Stand-Sit Transfer    Stand-Sit [place] (Transfers)  verbal cues;contact guard  -JJ  verbal cues;contact guard  -KJ     Assistive Device (Stand-Sit Transfers)  walker, front-wheeled  -JJ  --     Recorded by [JJ] Evette Garcia OTR/DEBORAH 08/12/20 1412 [KJ] Rama Babcock, PTA 08/12/20 1015     Row Name 08/12/20 1300             Stand Pivot/Stand Step Transfer    Stand Pivot/Stand Step [place]  minimum assist (75% patient effort);verbal cues  -JJ      Assistive Device (Stand Pivot Stand Step Transfer)  walker, front-wheeled  -JJ      Recorded by [JJ] Evette Garcia OTR/L 08/12/20 1412      Row Name 08/12/20 0830             Gait/Stairs Assessment/Training    Gait/Stairs Assessment/Training  gait/ambulation independence  -KJ      [place] Level (Gait)  verbal cues;contact guard;minimum assist (75% patient effort);2 person assist 2 for safety due to R leg buckling  -KJ      Assistive Device (Gait)  walker, front-wheeled  -KJ      Distance in Feet (Gait)  10' x 2; to BR assisted in clean up  -KJ      Pattern (Gait)  step-through  -KJ      Bilateral Gait Deviations  forward flexed posture  -KJ      Recorded by [KJ] Rama Babcock, PTA 08/12/20 1015      Row Name 08/12/20 1300             ADL Assessment/Intervention    BADL Assessment/Intervention  bathing;upper body dressing;lower body dressing  -JJ      Recorded by [JJ] Evette Garcia OTR/L 08/12/20 1412      Row Name 08/12/20 1300             Bathing Assessment/Intervention    Bathing [place] Level  lower body;maximum assist (25% patient effort);upper body;chest/trunk;proximal lower extremities;set up;supervision  -JJ      Bathing Position  supported sitting in shower chair  -JJ      Recorded by [JJ] Evette Garcia OTR/L 08/12/20 1412      Row Name 08/12/20 1300             Upper Body Dressing Assessment/Training    Upper Body Dressing [place] Level   don;doff;minimum assist (75% patient effort) gown; LSO  -JJ      Upper Body Dressing Position  unsupported sitting  -JJ      Recorded by [JJ] Evette Garcia OTR/L 08/12/20 1412      Row Name 08/12/20 1300             Lower Body Dressing Assessment/Training    Lower Body Dressing [place] Level  doff;don;socks;maximum assist (25% patient effort)  -JJ      Lower Body Dressing Position  unsupported sitting  -JJ      Recorded by [JJ] Evette Garcia OTR/L 08/12/20 1412      Row Name 08/12/20 0830             Therapeutic Exercise    Exercise Type (Therapeutic Exercise)  AROM (active range of motion)  -KJ      Position (Therapeutic Exercise)  seated  -KJ      Sets/Reps (Therapeutic Exercise)  15  -KJ      Recorded by [KJ] Rama Babcock, [facility] 08/12/20 1015      Row Name 08/12/20 1300 08/12/20 0830          Positioning and Restraints    Pre-Treatment Position  sitting in chair/recliner  -JJ  in bed  -KJ     Post Treatment Position  chair  -JJ  chair  -KJ     In Bed  --  call light within reach  -KJ     In Chair  reclined;notified nsg;call light within reach;encouraged to call for assist;with brace  -JJ  --     Recorded by [JREKHA] Evette Garcia OTR/DEBORAH 08/12/20 1412 [KJ] Rama Babcock, [facility] 08/12/20 1015     Row Name 08/12/20 1300             Pain Assessment    Additional Documentation  Pain Scale: FACES Pre/Post-Treatment (Group)  -JJ      Recorded by [JREKHA] Evette Garcia OTR/L 08/12/20 1412      Row Name 08/12/20 1300             Pain Scale: Numbers Pre/Post-Treatment    Pain Location - Side  Right  -JJ      Pain Location  hip  -JJ      Pain Intervention(s)  Repositioned;Ambulation/increased activity;Medication (See MAR)  -JJ      Recorded by [JREKHA] Evette Garcia OTR/L 08/12/20 1415      Row Name 08/12/20 1300             Pain Scale: FACES Pre/Post-Treatment    Pain: FACES Scale, Pretreatment  4-->hurts little more  -JJ      Pain: FACES Scale, Post-Treatment  4-->hurts little more  -JJ      Recorded  by [JJ] Evette Garcia OTR/L 08/12/20 1415      Row Name 08/12/20 1300             Spinal Orthosis Management    Type (Spinal Orthosis)  LSO (lumbar sacral orthosis)  -J      Functional Design (Spinal Orthosis)  static orthosis  -J      Therapeutic Indications (Spinal Orthosis)  pain management;post-op positioning/protection  -JJ      Wearing Schedule (Spinal Orthosis)  wear when out of bed only  -J      Orthosis Training (Spinal Orthosis)  patient;all orthosis skills  -JJ      Recorded by [JJ] Evette Garcia OTR/L 08/12/20 1415      Row Name 08/12/20 1300             Plan of Care Review    Plan of Care Reviewed With  patient  -J      Outcome Summary  OT tx completed. Pt agreeable and requesting shower this date. Required Min A for all functional t/fs with rwx. Max A for LB dressing. Min A for UB dressing. Set-up and Supervision for UB bathing and Mod/Max A for LB bathing tasks. Pt fatigued with activity and required more assist at end of tx. Continue OT POC.   -JJ      Recorded by [JJ] Evette Garcia OTR/L 08/12/20 1415      Row Name 08/12/20 1300             Outcome Summary/Treatment Plan (OT)    Daily Summary of Progress (OT)  progress toward functional goals is good  -J      Plan for Continued Treatment (OT)  continue OT POC  -JJ      Anticipated Discharge Disposition (OT)  inpatient rehabilitation facility  -JJ      Recorded by [JREKHA] Evette Garcia OTR/L 08/12/20 1415        User Key  (r) = Recorded By, (t) = Taken By, (c) = Cosigned By    Initials Name Effective Dates Discipline    KJ Rama Babcock, JAREN 08/02/16 -  PT    Evette Hassan OTR/L 07/05/20 -  OT             Occupational Therapy Education                 Title: PT OT SLP Therapies (In Progress)     Topic: Occupational Therapy (Done)     Point: ADL training (Done)     Description:   Instruct learner(s) on proper safety adaptation and remediation techniques during self care or transfers.   Instruct in proper use of  assistive devices.              Learning Progress Summary           Patient Acceptance, E,D, VU,NR by  at 8/11/2020 0905   Family Acceptance, E,D, VU,NR by  at 8/11/2020 0905                   Point: Home exercise program (Done)     Description:   Instruct learner(s) on appropriate technique for monitoring, assisting and/or progressing therapeutic exercises/activities.              Learning Progress Summary           Patient Acceptance, E,D, VU,NR by  at 8/11/2020 0905   Family Acceptance, E,D, VU,NR by  at 8/11/2020 0905                   Point: Precautions (Done)     Description:   Instruct learner(s) on prescribed precautions during self-care and functional transfers.              Learning Progress Summary           Patient Acceptance, E,D, VU,NR by  at 8/11/2020 0905   Family Acceptance, E,D, VU,NR by  at 8/11/2020 0905                   Point: Body mechanics (Done)     Description:   Instruct learner(s) on proper positioning and spine alignment during self-care, functional mobility activities and/or exercises.              Learning Progress Summary           Patient Acceptance, E,D, VU,NR by  at 8/11/2020 0905   Family Acceptance, E,D, VU,NR by  at 8/11/2020 0905                               User Key     Initials Effective Dates Name Provider Type Discipline     07/05/20 -  Yancy Gordon, OTR/L Occupational Therapist OT                OT Recommendation and Plan  Outcome Summary/Treatment Plan (OT)  Daily Summary of Progress (OT): progress toward functional goals is good  Plan for Continued Treatment (OT): continue OT POC  Anticipated Discharge Disposition (OT): inpatient rehabilitation facility  Therapy Frequency (OT Eval): 5 times/wk  Daily Summary of Progress (OT): progress toward functional goals is good  Plan of Care Review  Plan of Care Reviewed With: patient  Plan of Care Reviewed With: patient  Outcome Summary: OT tx completed. Pt agreeable and requesting shower this date. Required  Min A for all functional t/fs with rwx. Max A for LB dressing. Min A for UB dressing. Set-up and Supervision for UB bathing and Mod/Max A for LB bathing tasks. Pt fatigued with activity and required more assist at end of tx. Continue OT POC.   Outcome Measures     Row Name 08/12/20 1400 08/11/20 0758          How much help from another is currently needed...    Putting on and taking off regular lower body clothing?  2  -  2  -CH     Bathing (including washing, rinsing, and drying)  2  -  2  -CH     Toileting (which includes using toilet bed pan or urinal)  2  -  2  -CH     Putting on and taking off regular upper body clothing  3  -  3  -CH     Taking care of personal grooming (such as brushing teeth)  4  -  4  -CH     Eating meals  4  -  4  -CH     AM-PAC 6 Clicks Score (OT)  17  -  17  -        Functional Assessment    Outcome Measure Options  AM-PAC 6 Clicks Daily Activity (OT)  -  AM-PAC 6 Clicks Daily Activity (OT)  -       User Key  (r) = Recorded By, (t) = Taken By, (c) = Cosigned By    Initials Name Provider Type     Yancy Gordon OTR/L Occupational Therapist    Evette Hassan OTR/L Occupational Therapist           Time Calculation:   Time Calculation- OT     Row Name 08/12/20 1415             Time Calculation- OT    OT Start Time  1300  -      OT Stop Time  1410  -      OT Time Calculation (min)  70 min  -      Total Timed Code Minutes- OT  70 minute(s)  -      OT Received On  08/12/20  -        User Key  (r) = Recorded By, (t) = Taken By, (c) = Cosigned By    Initials Name Provider Type    Evette Hassan OTR/L Occupational Therapist        Therapy Charges for Today     Code Description Service Date Service Provider Modifiers Qty    [id number] HC OT SELF CARE/MGMT/TRAIN EA 15 MIN 8/12/2020 Evette Garcia OTR/L GO 5               JAMEEL Mariano/DEBORAH  8/12/2020

## 2020-08-12 NOTE — PLAN OF CARE
Problem: Patient Care Overview  Goal: Plan of Care Review  Outcome: Ongoing (interventions implemented as appropriate)  Flowsheets (Taken 8/12/2020 1016)  Progress: improving  Plan of Care Reviewed With: patient  Outcome Summary: PT tx completed. Pt supine in bed. Rates back pn at rest 3/10.  Increased to 10/10 with mobility. Bed mobility Min/ModA, sit<>stand Ana, amb 15' x 2 with r wx Ana 1-2 for safety due to RLE buckling. Recommend SNF for cont'd PT/OT for strengthening.

## 2020-08-12 NOTE — PLAN OF CARE
Medicated patient for c/o pain with prn po pain meds, with relief noted. Patient is alert and oriented x 4. Refused to be repositioned. Likes to sleep on his back. Informed on need to be repositioned. States he has baseline numbness to RLE. He is a 2-3 person assist, needs lift team assistance. Noted his o2 sat dropped as low as 78% while he slept, room air. Placed 2L/nc while he slept. When he was on room air, and awake his o2 sat would be in mid 90's. Enc cough and deep breathe. SCD's on. Last bm 8/7. Safety maintained.

## 2020-08-13 VITALS
RESPIRATION RATE: 16 BRPM | DIASTOLIC BLOOD PRESSURE: 67 MMHG | OXYGEN SATURATION: 96 % | SYSTOLIC BLOOD PRESSURE: 120 MMHG | WEIGHT: 310 LBS | HEIGHT: 78 IN | TEMPERATURE: 98.2 F | HEART RATE: 77 BPM | BODY MASS INDEX: 35.87 KG/M2

## 2020-08-13 LAB
GLUCOSE BLDC GLUCOMTR-MCNC: 125 MG/DL (ref 70–130)
INR PPP: 1.25 (ref 0.91–1.09)
PROTHROMBIN TIME: 15.3 SECONDS (ref 11.9–14.6)
SARS-COV-2 RDRP RESP QL NAA+PROBE: NOT DETECTED

## 2020-08-13 PROCEDURE — 85610 PROTHROMBIN TIME: CPT | Performed by: FAMILY MEDICINE

## 2020-08-13 PROCEDURE — 97116 GAIT TRAINING THERAPY: CPT

## 2020-08-13 PROCEDURE — 87635 SARS-COV-2 COVID-19 AMP PRB: CPT | Performed by: FAMILY MEDICINE

## 2020-08-13 PROCEDURE — 82962 GLUCOSE BLOOD TEST: CPT

## 2020-08-13 RX ORDER — WARFARIN SODIUM 5 MG/1
10 TABLET ORAL
Status: DISCONTINUED | OUTPATIENT
Start: 2020-08-13 | End: 2020-08-13 | Stop reason: HOSPADM

## 2020-08-13 RX ORDER — OXYCODONE AND ACETAMINOPHEN 7.5; 325 MG/1; MG/1
1 TABLET ORAL EVERY 4 HOURS PRN
Qty: 60 TABLET | Refills: 0
Start: 2020-08-13 | End: 2020-08-20

## 2020-08-13 RX ORDER — WARFARIN SODIUM 5 MG/1
5 TABLET ORAL
Status: DISCONTINUED | OUTPATIENT
Start: 2020-08-14 | End: 2020-08-13 | Stop reason: HOSPADM

## 2020-08-13 RX ADMIN — POTASSIUM CHLORIDE 20 MEQ: 1.5 POWDER, FOR SOLUTION ORAL at 09:16

## 2020-08-13 RX ADMIN — METOPROLOL SUCCINATE 100 MG: 100 TABLET, EXTENDED RELEASE ORAL at 09:17

## 2020-08-13 RX ADMIN — FUROSEMIDE 40 MG: 40 TABLET ORAL at 09:16

## 2020-08-13 RX ADMIN — ASPIRIN 81 MG 81 MG: 81 TABLET ORAL at 09:17

## 2020-08-13 RX ADMIN — METFORMIN HYDROCHLORIDE 1000 MG: 500 TABLET ORAL at 09:16

## 2020-08-13 RX ADMIN — POLYETHYLENE GLYCOL (3350) 17 G: 17 POWDER, FOR SOLUTION ORAL at 09:17

## 2020-08-13 RX ADMIN — SULFAMETHOXAZOLE AND TRIMETHOPRIM 160 MG: 800; 160 TABLET ORAL at 09:16

## 2020-08-13 RX ADMIN — LEVOTHYROXINE SODIUM 175 MCG: 150 TABLET ORAL at 05:33

## 2020-08-13 RX ADMIN — OXYCODONE HYDROCHLORIDE AND ACETAMINOPHEN 1 TABLET: 7.5; 325 TABLET ORAL at 03:37

## 2020-08-13 RX ADMIN — OXYCODONE HYDROCHLORIDE AND ACETAMINOPHEN 1 TABLET: 7.5; 325 TABLET ORAL at 09:16

## 2020-08-13 RX ADMIN — GUAIFENESIN 1200 MG: 600 TABLET, EXTENDED RELEASE ORAL at 09:16

## 2020-08-13 RX ADMIN — GLIPIZIDE 5 MG: 5 TABLET ORAL at 09:16

## 2020-08-13 NOTE — DISCHARGE SUMMARY
[facility]  SPINE SURGERY  RAMAAN Monsalve  DISCHARGE SUMMARY  Patient ID:  Shabbir Solorio  [id number]  75 y.o.  1945    Admit date: 8/10/2020    Discharge date and time: 8/13/2020    Admitting Physician: MIKI Cloud MD     Discharge Diagnosis:   1. Intractable Back pain   2. Severe right anterior thigh and leg radiculopathy.  3. Multilevel lumbar degenerative disk disease.  4. Multilevel lumbar facet arthropathy.  5. Large foraminal disk herniation , right L3-4.  6. Severe central and right-sided foraminal stenosis, L3-4.  7. Atrial fibrillation, on Coumadin.  8. History of bladder cancer.  9. Status post right L3-4 hemilaminectomy decompression with complete facetectomy, neuroforaminotomy, discectomy,  TLIF L3-4, PSF with instrumentation L3-4, 8/7/2020      Patient Instructions:      Discharge Medications      Continue These Medications      Instructions Start Date   aspirin 81 MG chewable tablet   81 mg, Oral, Daily      atorvastatin 10 MG tablet  Commonly known as:  LIPITOR   10 mg, Oral, Daily      fish oil 1200 MG capsule capsule   1,200 mg, Oral, Daily      furosemide 40 MG tablet  Commonly known as:  LASIX   40 mg, Oral, 2 Times Daily      glipizide 5 MG tablet  Commonly known as:  GLUCOTROL   5 mg, Oral, Daily      levothyroxine 175 MCG tablet  Commonly known as:  SYNTHROID, LEVOTHROID   175 mcg, Oral, Daily      metFORMIN 1000 MG tablet  Commonly known as:  GLUCOPHAGE   1,000 mg, Oral, 2 Times Daily With Meals      metoprolol succinate  MG 24 hr tablet  Commonly known as:  TOPROL-XL   100 mg, Oral, 2 times daily      montelukast 10 MG tablet  Commonly known as:  SINGULAIR   10 mg, Oral, Nightly      Mucinex 600 MG 12 hr tablet  Generic drug:  guaiFENesin   1,200 mg, Oral, Daily      oxyCODONE-acetaminophen 7.5-325 MG per tablet  Commonly known as:  PERCOCET   1 tablet, Oral, Every 4 Hours PRN      potassium chloride 20 MEQ packet  Commonly known  as:  KLOR-CON   20 mEq, Oral, Daily      vitamin C 500 MG tablet  Commonly known as:  ASCORBIC ACID   500 mg, Oral, Daily      vitamin D 1.25 MG (01501 UT) capsule capsule  Commonly known as:  ERGOCALCIFEROL   50,000 Units, Oral, Weekly      warfarin 5 MG tablet  Commonly known as:  COUMADIN   5 mg, Oral, Daily Warfarin           Activity: Avoid bending lifting or twisting, no driving while taking narcotic pain medication, walk 10-15 minutes 2-3 times daily  Diet: regular diet  Wound Care: keep wound clean and dry  Other: Contact [facility] [place] office with questions or concerns    Follow-up with Dr Cloud or PA's in 2 weeks.    Electronically signed by RAMANA Monsalve 07:44 8/13/2020

## 2020-08-13 NOTE — THERAPY TREATMENT NOTE
Acute Care - Physical Therapy Treatment Note  [facility]     Patient Name: Shabbir Solorio  : 1945  MRN: [id number]  Today's Date: 2020  Onset of Illness/Injury or Date of Surgery: 08/10/20     Referring Physician: Dr. Cloud     Admit Date: 8/10/2020    Visit Dx:    ICD-10-CM ICD-9-CM   1. Lumbar radiculopathy M54.16 724.4   2. Decreased activities of daily living (ADL) Z78.9 V49.89   3. Impaired functional mobility, balance, gait, and endurance Z74.09 V49.89     Patient Active Problem List   Diagnosis   • Lumbar radiculopathy   • DDD (degenerative disc disease), lumbar   • Lumbar back pain   • Chronic atrial fibrillation (CMS/HCC)   • Long term current use of anticoagulant therapy   • Nonischemic cardiomyopathy (CMS/HCC)   • KEYONA (obstructive sleep apnea)   • S/P AVR (aortic valve replacement)   • LV dysfunction   • Type 2 diabetes mellitus (CMS/HCC)   • Essential hypertension   • Hyperlipidemia   • CKD (chronic kidney disease) stage 3, GFR 30-59 ml/min (CMS/HCC)   • Hypothyroidism (acquired)   • Decreased activities of daily living (ADL)       Therapy Treatment    Rehabilitation Treatment Summary     Row Name 20             Treatment Time/Intention    Discipline  physical therapy assistant  -KJ      Document Type  therapy note (daily note)  -KJ      Subjective Information  complains of;pain  -KJ2      Mode of Treatment  physical therapy  -KJ2      Patient Effort  good  -KJ2      Existing Precautions/Restrictions  fall;brace worn when out of bed;spinal  -KJ2      Treatment Considerations/Comments  LSO  -KJ2      Recorded by [KJ] Rama Babcock, PTA 20 0856  [KJ2] Rama Babcock, PTA 2014      Row Name 2039             Bed Mobility Assessment/Treatment    Sidelying-Sit Ashley (Bed Mobility)  verbal cues;contact guard  -KJ      Assistive Device (Bed Mobility)  bed rails  -KJ      Recorded by [KJ] Rama Babcock, PTA 2014      Row Name 2016              Sit-Stand Transfer    Sit-Stand Mathis (Transfers)  verbal cues;minimum assist (75% patient effort)  -KJ      Assistive Device (Sit-Stand Transfers)  walker, front-wheeled  -KJ      Recorded by [KJ] Rama Babcock, [facility] 08/13/20 0914      Row Name 08/13/20 0839             Stand-Sit Transfer    Stand-Sit Mathis (Transfers)  verbal cues;contact guard;minimum assist (75% patient effort)  -KJ      Assistive Device (Stand-Sit Transfers)  walker, front-wheeled  -KJ      Recorded by [KJ] Rama Babcock, [facility] 08/13/20 0914      Row Name 08/13/20 0839             Gait/Stairs Assessment/Training    Gait/Stairs Assessment/Training  gait/ambulation independence  -KJ      Mathis Level (Gait)  verbal cues;minimum assist (75% patient effort)  -KJ      Assistive Device (Gait)  walker, front-wheeled  -KJ      Distance in Feet (Gait)  15' x 2; sittiing rest 45'  -KJ      Pattern (Gait)  step-through  -KJ      Bilateral Gait Deviations  forward flexed posture  -KJ      Recorded by [KJ] Rama Babcokc, [facility] 08/13/20 0914      Row Name 08/13/20 0839             Positioning and Restraints    Pre-Treatment Position  in bed  -KJ      Post Treatment Position  chair  -KJ      In Chair  sitting;call light within reach  -KJ      Recorded by [KJ] Rama Babcock, [facility] 08/13/20 0914      Row Name 08/13/20 0839             Pain Scale: Numbers Pre/Post-Treatment    Pain Scale: Numbers, Pretreatment  6/10  -KJ      Pain Scale: Numbers, Post-Treatment  10/10  -KJ      Pain Location - Side  Right  -KJ      Pain Location - Orientation  lower  -KJ      Pain Location  extremity groin  -KJ      Recorded by [KJ] aRma Babcock, [facility] 08/13/20 0914        User Key  (r) = Recorded By, (t) = Taken By, (c) = Cosigned By    Initials Name Effective Dates Discipline    KJ Rama Babcock, [facility] 08/02/16 -  PT                   Physical Therapy Education                 Title: PT OT SLP Therapies (In Progress)     Topic: Physical  Therapy (In Progress)     Point: Mobility training (In Progress)     Description:   Instruct learner(s) on safety and technique for assisting patient out of bed, chair or wheelchair.  Instruct in the proper use of assistive devices, such as walker, crutches, cane or brace.              Patient Friendly Description:   It's important to get you on your feet again, but we need to do so in a way that is safe for you. Falling has serious consequences, and your personal safety is the most important thing of all.        When it's time to get out of bed, one of us or a family member will sit next to you on the bed to give you support.     If your doctor or nurse tells you to use a walker, crutches, a cane, or a brace, be sure you use it every time you get out of bed, even if you think you don't need it.    Learning Progress Summary           Patient Acceptance, E, NR by MS at 8/11/2020 9571    Comment:  role of PT in his care, safety for R knee buckling                   Point: Home exercise program (Not Started)     Description:   Instruct learner(s) on appropriate technique for monitoring, assisting and/or progressing patient with therapeutic exercises and activities.              Learner Progress:   Not documented in this visit.          Point: Body mechanics (Not Started)     Description:   Instruct learner(s) on proper positioning and spine alignment for patient and/or caregiver during mobility tasks and/or exercises.              Learner Progress:   Not documented in this visit.          Point: Precautions (Not Started)     Description:   Instruct learner(s) on prescribed precautions during mobility and gait tasks              Learner Progress:   Not documented in this visit.                      User Key     Initials Effective Dates Name Provider Type Discipline    MS 06/19/18 -  Sharon Fay, PT, DPT, NCS Physical Therapist PT                PT Recommendation and Plan     Plan of Care Reviewed With:  patient  Progress: improving  Outcome Summary: PT tx completed. Pt supine in bed. Rates pnn 10/10 when walking in low back radiating from groin down R leg. CG sidelying>sit, sit<>stand Ana, amb 15', 15',40' with r wx Ana, Pain increases with walking and improves with sitting. Weakness RLE and he feels like it could still bucle. Plan for discharge today.  Outcome Measures     Row Name 08/12/20 1400 08/11/20 0758          How much help from another is currently needed...    Putting on and taking off regular lower body clothing?  2  -JJ  2  -CH     Bathing (including washing, rinsing, and drying)  2  -JJ  2  -CH     Toileting (which includes using toilet bed pan or urinal)  2  -JJ  2  -CH     Putting on and taking off regular upper body clothing  3  -JJ  3  -CH     Taking care of personal grooming (such as brushing teeth)  4  -  4  -CH     Eating meals  4  -  4  -CH     AM-PAC 6 Clicks Score (OT)  17  -  17  -CH        Functional Assessment    Outcome Measure Options  AM-PAC 6 Clicks Daily Activity (OT)  -J  AM-PAC 6 Clicks Daily Activity (OT)  -       User Key  (r) = Recorded By, (t) = Taken By, (c) = Cosigned By    Initials Name Provider Type    Yancy Charles, OTR/L Occupational Therapist    Evette Hassan OTR/L Occupational Therapist         Time Calculation:   PT Charges     Row Name 08/13/20 0914             Time Calculation    Start Time  0839  -KJ      Stop Time  0910  -KJ      Time Calculation (min)  31 min  -KJ      PT Received On  08/13/20  -KJ      PT Goal Re-Cert Due Date  08/21/20  -KJ         Time Calculation- PT    Total Timed Code Minutes- PT  31 minute(s)  -KJ        User Key  (r) = Recorded By, (t) = Taken By, (c) = Cosigned By    Initials Name Provider Type    Rama Wynn PTA Physical Therapy Assistant        Therapy Charges for Today     Code Description Service Date Service Provider Modifiers Qty    22071800282 HC GAIT TRAINING EA 15 MIN 8/12/2020 Rama Babcock PTA  GP 2    78905848365 HC PT THERAPEUTIC ACT EA 15 MIN 8/12/2020 Rama Babcock, PTA GP 1    83869142733 HC GAIT TRAINING EA 15 MIN 8/13/2020 Rama Babcock, PTA GP 2          PT G-Codes  Outcome Measure Options: AM-PAC 6 Clicks Daily Activity (OT)  AM-PAC 6 Clicks Score (PT): 8  AM-PAC 6 Clicks Score (OT): 17    Rama Babcock PTA  8/13/2020

## 2020-08-13 NOTE — PLAN OF CARE
Problem: Patient Care Overview  Goal: Plan of Care Review  Outcome: Ongoing (interventions implemented as appropriate)  Flowsheets (Taken 8/13/2020 0322)  Progress: no change  Plan of Care Reviewed With: patient  Outcome Summary: Pt is A&Ox4. PRN pan med given with good relief. Complains of N/T to right leg. Insicion on back is CDI. No neuro changes noted. VSS. Safety maintained.

## 2020-08-13 NOTE — PROGRESS NOTES
Continued Stay Note  [facility]     Patient Name: Shabbir Solorio  MRN: [id number]  Today's Date: 8/13/2020    Admit Date: 8/10/2020    Discharge Plan     Row Name 08/13/20 0755       Plan    Final Discharge Disposition Code  03 - skilled nursing facility (SNF)    Final Note  Pt is able to be discharged to [facility] today pending negative covid-19 test per Sena from facility. FIONA Marquez aware of need of Covid-19 test. SW will follow and assist with any other discharge needs that may arise.   Phone: [phone]         Fax: [phone]                Discharge Codes    No documentation.       Expected Discharge Date and Time     Expected Discharge Date Expected Discharge Time    Aug 13, 2020             Stephie Person

## 2020-08-13 NOTE — PROGRESS NOTES
Shabbir Solorio is a 75 y.o. male patient.  Still no bowel movement.  More alert today.  Working on extension flexion of lower extremities.        Current Facility-Administered Medications   Medication Dose Route Frequency Provider Last Rate Last Dose   • acetaminophen (TYLENOL) tablet 650 mg  650 mg Oral Q6H PRN Anish Finney MD       • aspirin chewable tablet 81 mg  81 mg Oral Daily Howard Aguilar MD   81 mg at 08/12/20 0822   • atorvastatin (LIPITOR) tablet 10 mg  10 mg Oral Nightly Howard Aguilar MD   10 mg at 08/12/20 2128   • bisacodyl (DULCOLAX) suppository 10 mg  10 mg Rectal Daily Howard Aguilar MD   10 mg at 08/12/20 1036   • dextrose (D50W) 25 g/ 50mL Intravenous Solution 25 g  25 g Intravenous Q15 Min PRN Howard Aguilar MD       • dextrose (GLUTOSE) oral gel 15 g  15 g Oral Q15 Min PRN Howard Aguilar MD       • furosemide (LASIX) tablet 40 mg  40 mg Oral BID MIKI Cloud MD   40 mg at 08/12/20 1705   • glipizide (GLUCOTROL) tablet 5 mg  5 mg Oral Daily MIKI Cloud MD   5 mg at 08/12/20 0822   • glucagon (human recombinant) (GLUCAGEN DIAGNOSTIC) injection 1 mg  1 mg Subcutaneous Q15 Min PRN Howard Aguilar MD       • guaiFENesin (MUCINEX) 12 hr tablet 1,200 mg  1,200 mg Oral Daily Howard Aguilar MD   1,200 mg at 08/12/20 0822   • HYDROmorphone (DILAUDID) injection 0.5 mg  0.5 mg Intravenous Q2H PRN Anish Finney MD   0.5 mg at 08/11/20 0525   • insulin lispro (humaLOG) injection 2-7 Units  2-7 Units Subcutaneous TID AC Howard Aguilar MD   2 Units at 08/12/20 1707   • levothyroxine (SYNTHROID, LEVOTHROID) tablet 175 mcg  175 mcg Oral Q AM Howard Aguilar MD   175 mcg at 08/13/20 0533   • magnesium citrate solution 296 mL  296 mL Oral Daily PRN Howard Aguilar MD       • metoprolol succinate XL (TOPROL-XL) 24 hr tablet 100 mg  100 mg Oral Q12H MIKI Cloud MD   100 mg at 08/12/20 2125   • montelukast  "(SINGULAIR) tablet 10 mg  10 mg Oral Nightly Howard Aguilar MD   10 mg at 08/12/20 2128   • ondansetron (ZOFRAN) injection 4 mg  4 mg Intravenous Q6H PRN Anish Finney MD       • oxyCODONE-acetaminophen (PERCOCET) 7.5-325 MG per tablet 1 tablet  1 tablet Oral Q4H PRN Anish Finney MD   1 tablet at 08/13/20 0337   • oxyCODONE-acetaminophen (PERCOCET) 7.5-325 MG per tablet 1 tablet  1 tablet Oral Q4H PRN Howard Aguilar MD   1 tablet at 08/12/20 1107   • polyethylene glycol 3350 powder (packet)  17 g Oral BID Howard Aguilar MD   17 g at 08/12/20 0823   • potassium chloride (KLOR-CON) packet 20 mEq  20 mEq Oral Daily Howard Aguilar MD   20 mEq at 08/12/20 0822   • sulfamethoxazole-trimethoprim (BACTRIM DS,SEPTRA DS) 800-160 MG per tablet 160 mg  1 tablet Oral Q12H Howard Aguilar MD   160 mg at 08/12/20 2128   • warfarin (COUMADIN) tablet 5 mg  5 mg Oral Daily Howard Aguilar MD         ALLERGIES:    Allergies   Allergen Reactions   • Advair Diskus [Fluticasone-Salmeterol] Other (See Comments)     Cannot tolerate inhaling the medication   • Penicillins Other (See Comments)     Does not remember         Lumbar radiculopathy    Chronic atrial fibrillation (CMS/HCC)    Long term current use of anticoagulant therapy    KEYONA (obstructive sleep apnea)    S/P AVR (aortic valve replacement)    Type 2 diabetes mellitus (CMS/HCC)    Essential hypertension    Hyperlipidemia    CKD (chronic kidney disease) stage 3, GFR 30-59 ml/min (CMS/HCC)    Hypothyroidism (acquired)    Decreased activities of daily living (ADL)    Blood pressure 120/67, pulse 77, temperature 98.2 °F (36.8 °C), temperature source Oral, resp. rate 16, height 210.8 cm (83\"), weight (!) 141 kg (310 lb), SpO2 96 %.      Subjective:  Symptoms:  Stable.    Diet:  Adequate intake.    Activity level: Impaired due to weakness.    Pain:  He complains of pain that is mild.  He reports pain is improving.  Pain is well " "controlled.      Review of Systems  Objective:  General Appearance:  Comfortable and well-appearing.    Vital signs: (most recent): Blood pressure 120/67, pulse 77, temperature 98.2 °F (36.8 °C), temperature source Oral, resp. rate 16, height 210.8 cm (83\"), weight (!) 141 kg (310 lb), SpO2 96 %.  Vital signs are normal.    Output: Producing urine and no stool output.    HEENT: Normal HEENT exam.    Lungs:  Normal effort and normal respiratory rate.    Heart: Normal rate.  Positive for murmur.  (Mechanical heart valve)  Abdomen: Abdomen is soft and distended.  Hypoactive bowel sounds.     Extremities: Decreased range of motion.    Neurological: Patient is alert and oriented to person, place and time.              Labs:  Lab Results (last 72 hours)     Procedure Component Value Units Date/Time    Protime-INR [[id number]]  (Abnormal) Collected:  08/12/20 0648    Specimen:  Blood Updated:  08/12/20 0702     Protime 15.0 Seconds      INR 1.22    Iron Profile [[id number]]  (Abnormal) Collected:  08/12/20 0536    Specimen:  Blood Updated:  08/12/20 0645     Iron 24 mcg/dL      Iron Saturation 11 %      Transferrin 142 mg/dL      TIBC 212 mcg/dL     Comprehensive Metabolic Panel [[id number]]  (Abnormal) Collected:  08/12/20 0536    Specimen:  Blood Updated:  08/12/20 0628     Glucose 116 mg/dL      BUN 17 mg/dL      Creatinine 1.15 mg/dL      Sodium 140 mmol/L      Potassium 3.9 mmol/L      Chloride 99 mmol/L      CO2 30.0 mmol/L      Calcium 8.9 mg/dL      Total Protein 6.6 g/dL      Albumin 3.40 g/dL      ALT (SGPT) 12 U/L      AST (SGOT) 18 U/L      Alkaline Phosphatase 39 U/L      Total Bilirubin 0.5 mg/dL      eGFR Non African Amer 62 mL/min/1.73      Globulin 3.2 gm/dL      A/G Ratio 1.1 g/dL      BUN/Creatinine Ratio 14.8     Anion Gap 11.0 mmol/L     Narrative:       GFR Normal >60  Chronic Kidney Disease <60  Kidney Failure <15      TSH [[id number]]  (Abnormal) Collected:  08/12/20 0536    Specimen:  Blood Updated:  " 08/12/20 0628     TSH 4.730 uIU/mL     CBC & Differential [[id number]] Collected:  08/12/20 0536    Specimen:  Blood Updated:  08/12/20 0601    Narrative:       The following orders were created for panel order CBC & Differential.  Procedure                               Abnormality         Status                     ---------                               -----------         ------                     CBC Auto Differential[[id number]]        Abnormal            Final result                 Please view results for these tests on the individual orders.    CBC Auto Differential [[id number]]  (Abnormal) Collected:  08/12/20 0536    Specimen:  Blood Updated:  08/12/20 0601     WBC 10.66 10*3/mm3      RBC 3.46 10*6/mm3      Hemoglobin 10.6 g/dL      Hematocrit 32.7 %      MCV 94.5 fL      MCH 30.6 pg      MCHC 32.4 g/dL      RDW 12.7 %      RDW-SD 43.7 fl      MPV 10.4 fL      Platelets 227 10*3/mm3      Neutrophil % 62.0 %      Lymphocyte % 20.3 %      Monocyte % 10.6 %      Eosinophil % 5.8 %      Basophil % 0.8 %      Immature Grans % 0.5 %      Neutrophils, Absolute 6.62 10*3/mm3      Lymphocytes, Absolute 2.16 10*3/mm3      Monocytes, Absolute 1.13 10*3/mm3      Eosinophils, Absolute 0.62 10*3/mm3      Basophils, Absolute 0.08 10*3/mm3      Immature Grans, Absolute 0.05 10*3/mm3      nRBC 0.0 /100 WBC     Vitamin B12 [[id number]] Collected:  08/12/20 0536    Specimen:  Blood Updated:  08/12/20 0559    Folate [[id number]] Collected:  08/12/20 0536    Specimen:  Blood Updated:  08/12/20 0559    POC Glucose Once [[id number]]  (Normal) Collected:  08/11/20 2050    Specimen:  Blood Updated:  08/11/20 2111     Glucose 112 mg/dL      Comment: : [id number] Castleman TamaraMeter ID: [id number]       POC Glucose Once [[id number]]  (Normal) Collected:  08/11/20 1644    Specimen:  Blood Updated:  08/11/20 1655     Glucose 108 mg/dL      Comment: : [id number] Luba CrisostomoMeter ID: [id number]       POC Glucose Once [[id number]]   (Normal) Collected:  08/11/20 1312    Specimen:  Blood Updated:  08/11/20 1325     Glucose 130 mg/dL      Comment: : [id number] Luba GraceMeter ID: [id number]       POC Glucose Once [[id number]]  (Abnormal) Collected:  08/11/20 1041    Specimen:  Blood Updated:  08/11/20 1118     Glucose 144 mg/dL      Comment: : [id number] Luba GraceMeter ID: [id number]       Urinalysis, Microscopic Only - Urine, Clean Catch [[id number]]  (Abnormal) Collected:  08/11/20 0847    Specimen:  Urine, Clean Catch Updated:  08/11/20 0918     RBC, UA 3-5 /HPF      WBC, UA 21-30 /HPF      Bacteria, UA Trace /HPF      Squamous Epithelial Cells, UA 0-2 /HPF      Hyaline Casts, UA None Seen /LPF      Methodology Manual Light Microscopy    Urinalysis With Microscopic If Indicated (No Culture) - Urine, Clean Catch [[id number]]  (Abnormal) Collected:  08/11/20 0847    Specimen:  Urine, Clean Catch Updated:  08/11/20 0915     Color, UA Yellow     Appearance, UA Cloudy     pH, UA 6.0     Specific Gravity, UA >=1.030     Glucose, UA Negative     Ketones, UA 40 mg/dL (2+)     Bilirubin, UA Negative     Blood, UA Moderate (2+)     Protein, UA 30 mg/dL (1+)     Leuk Esterase, UA Trace     Nitrite, UA Negative     Urobilinogen, UA 1.0 E.U./dL    POC Glucose Once [[id number]]  (Abnormal) Collected:  08/11/20 0844    Specimen:  Blood Updated:  08/11/20 0857     Glucose 157 mg/dL      Comment: : [id number] Luba GraceMeter ID: [id number]             Imaging Results (Last 72 Hours)     ** No results found for the last 72 hours. **                Assessment:    Condition: In stable condition.  Improving.   (    Type 2 diabetes- restart Glucophage today as of the 48 hours after IV dye.  disontinue with Accu-Chek sliding scale insulin.    Constipation/obstipation-MiraLAX twice daily.  1 bottle mag citrate.  Will stimulate from below with Dulcolax suppository.  May need to repeat mag citrate if no results.      Medically stable for discharge to  skilled nursing facility today  Mechanical heart valve-INR goal 2-3.  Will increase Coumadin from 5 to 10 mg today.    Extended conversation with wife at bedside today.     Urinary tract infection-MRSA.  Resistant to tetracycline therefore discontinue doxycycline and place him on Septra DS 1 p.o. twice daily x10 days.).     Problem List:     Lumbar radiculopathy    Chronic atrial fibrillation (CMS/HCC)    Long term current use of anticoagulant therapy    KEYONA (obstructive sleep apnea)    S/P AVR (aortic valve replacement)    Type 2 diabetes mellitus (CMS/[facility])    Essential hypertension    Hyperlipidemia    CKD (chronic kidney disease) stage 3, GFR 30-59 ml/min (CMS/[facility])    Hypothyroidism (acquired)    Decreased activities of daily living (ADL)    Howard Aguilar MD  8/13/2020

## 2020-08-13 NOTE — THERAPY DISCHARGE NOTE
Acute Care - Physical Therapy Discharge Summary  [facility]       Patient Name: Shabbir Solorio  : 1945  MRN: [id number]    Today's Date: 2020  Onset of Illness/Injury or Date of Surgery: 08/10/20       Referring Physician: Dr. Cloud       Admit Date: 8/10/2020      PT Recommendation and Plan    Visit Dx:    ICD-10-CM ICD-9-CM   1. Lumbar radiculopathy M54.16 724.4   2. Decreased activities of daily living (ADL) Z78.9 V49.89   3. Impaired functional mobility, balance, gait, and endurance Z74.09 V49.89       Outcome Measures     Row Name 20 1400 20 0758          How much help from another is currently needed...    Putting on and taking off regular lower body clothing?  2  -JJ  2  -CH     Bathing (including washing, rinsing, and drying)  2  -JJ  2  -CH     Toileting (which includes using toilet bed pan or urinal)  2  -JJ  2  -CH     Putting on and taking off regular upper body clothing  3  -JJ  3  -CH     Taking care of personal grooming (such as brushing teeth)  4  -JJ  4  -CH     Eating meals  4  -J  4  -CH     AM-PAC 6 Clicks Score (OT)  17  -  17  -CH        Functional Assessment    Outcome Measure Options  AM-PAC 6 Clicks Daily Activity (OT)  -JJ  AM-PAC 6 Clicks Daily Activity (OT)  -CH       User Key  (r) = Recorded By, (t) = Taken By, (c) = Cosigned By    Initials Name Provider Type    CH Yancy Gordon, OTR/L Occupational Therapist    JEvette Velez OTR/L Occupational Therapist          PT Charges     Row Name 20 0914             Time Calculation    Start Time  0839  -KJ      Stop Time  0910  -KJ      Time Calculation (min)  31 min  -KJ      PT Received On  20  -KJ      PT Goal Re-Cert Due Date  20  -KJ         Time Calculation- PT    Total Timed Code Minutes- PT  31 minute(s)  -KJ        User Key  (r) = Recorded By, (t) = Taken By, (c) = Cosigned By    Initials Name Provider Type    Rama Wynn, PTA Physical Therapy Assistant          Rehab Goal  Summary     Row Name 08/13/20 1451             Bed Mobility Goal 1 (PT)    Activity/Assistive Device (Bed Mobility Goal 1, PT)  rolling to left;rolling to right;sidelying to sit;sit to sidelying;bed rails  -      [place] Level/Cues Needed (Bed Mobility Goal 1, PT)  minimum assist (75% or more patient effort);tactile cues required;verbal cues required  -      Time Frame (Bed Mobility Goal 1, PT)  long term goal (LTG);by discharge  -      Progress/Outcomes (Bed Mobility Goal 1, PT)  goal met  -         Transfer Goal 1 (PT)    Activity/Assistive Device (Transfer Goal 1, PT)  sit-to-stand/stand-to-sit;bed-to-chair/chair-to-bed;walker, rolling  -      [place] Level/Cues Needed (Transfer Goal 1, PT)  minimum assist (75% or more patient effort);tactile cues required;verbal cues required  -      Time Frame (Transfer Goal 1, PT)  long term goal (LTG);by discharge  -      Progress/Outcome (Transfer Goal 1, PT)  goal met  -         Gait Training Goal 1 (PT)    Activity/Assistive Device (Gait Training Goal 1, PT)  gait (walking locomotion);assistive device use;decrease fall risk;increase endurance/gait distance;walker, rolling  -      [place] Level (Gait Training Goal 1, PT)  minimum assist (75% or more patient effort);tactile cues required;verbal cues required  -      Distance (Gait Goal 1, PT)  25ft with pt reaching R knee ext and no R knee buckling  -      Time Frame (Gait Training Goal 1, PT)  long term goal (LTG);by discharge  -      Progress/Outcome (Gait Training Goal 1, PT)  goal not met  -         ROM Goal 1 (PT)    ROM Goal 1 (PT)  R knee ext to reach 0 degrees against gravity  -      Time Frame (ROM Goal 1, PT)  long term goal (LTG);by discharge  -      Progress/Outcome (ROM Goal 1, PT)  goal not met  -        User Key  (r) = Recorded By, (t) = Taken By, (c) = Cosigned By    Initials Name Provider Type Discipline     Tracy López, PTA Physical Therapy Assistant PT               PT Discharge Summary  Reason for Discharge: Discharge from facility  Outcomes Achieved: Refer to plan of care for updates on goals achieved  Discharge Destination: SNF      Tracy López, PTA   8/13/2020

## 2020-08-14 NOTE — THERAPY DISCHARGE NOTE
Acute Care - Occupational Therapy Discharge Summary  [facility]     Patient Name: Shabbir Solorio  : 1945  MRN: [id number]    Today's Date: 2020  Onset of Illness/Injury or Date of Surgery: 08/10/20    Date of Referral to OT: 08/10/20  Referring Physician: Dr. Cloud       Admit Date: 8/10/2020        OT Recommendation and Plan    Visit Dx:    ICD-10-CM ICD-9-CM   1. Lumbar radiculopathy M54.16 724.4   2. Decreased activities of daily living (ADL) Z78.9 V49.89   3. Impaired functional mobility, balance, gait, and endurance Z74.09 V49.89               Rehab Goal Summary     Row Name 20 1300             Transfer Goal 1 (OT)    Activity/Assistive Device (Transfer Goal 1, OT)  sit-to-stand/stand-to-sit;bed-to-chair/chair-to-bed;commode;walker, rolling  -TS      [place]es Level/Cues Needed (Transfer Goal 1, OT)  minimum assist (75% or more patient effort)  -TS      Time Frame (Transfer Goal 1, OT)  long term goal (LTG);by discharge  -TS      Progress/Outcome (Transfer Goal 1, OT)  goal not met  -TS         Dressing Goal 1 (OT)    Activity/Assistive Device (Dressing Goal 1, OT)  upper body dressing  -TS      Coles/Cues Needed (Dressing Goal 1, OT)  supervision required;verbal cues required  -TS      Time Frame (Dressing Goal 1, OT)  long term goal (LTG);by discharge  -TS      Progress/Outcome (Dressing Goal 1, OT)  goal not met  -TS         Toileting Goal 1 (OT)    Activity/Device (Toileting Goal 1, OT)  toileting skills, all;adjust/manage clothing;perform perineal hygiene;commode, 3-in-1;commode;grab bar/safety frame  -TS      [place]es Level/Cues Needed (Toileting Goal 1, OT)  moderate assist (50-74% patient effort);verbal cues required  -TS      Time Frame (Toileting Goal 1, OT)  long term goal (LTG);by discharge  -TS      Progress/Outcome (Toileting Goal 1, OT)  goal not met  -TS        User Key  (r) = Recorded By, (t) = Taken By, (c) = Cosigned By    Initials Name Provider Type  Discipline    TS Landy Borden COTA/L Occupational Therapy Assistant OT          Outcome Measures     Row Name 08/12/20 1400             How much help from another is currently needed...    Putting on and taking off regular lower body clothing?  2  -JJ      Bathing (including washing, rinsing, and drying)  2  -JJ      Toileting (which includes using toilet bed pan or urinal)  2  -JJ      Putting on and taking off regular upper body clothing  3  -JJ      Taking care of personal grooming (such as brushing teeth)  4  -JJ      Eating meals  4  -JJ      AM-PAC 6 Clicks Score (OT)  17  -JJ         Functional Assessment    Outcome Measure Options  AM-PAC 6 Clicks Daily Activity (OT)  -JJ        User Key  (r) = Recorded By, (t) = Taken By, (c) = Cosigned By    Initials Name Provider Type    Evette Hassan OTR/L Occupational Therapist          Therapy Suggested Charges     Code   Minutes Charges    None                 OT Discharge Summary  Reason for Discharge: Discharge from facility  Outcomes Achieved: Refer to plan of care for updates on goals achieved  Discharge Destination: [facility]      MALIA Rojas  8/14/2020

## 2023-01-19 NOTE — PLAN OF CARE
Problem: Patient Care Overview  Goal: Plan of Care Review  Flowsheets (Taken 8/11/2020 0758)  Progress: no change  Plan of Care Reviewed With: patient;spouse  Note:   OT eval completed.  Mr. Solorio was at this facility 8/7/2020 for lumbar sx.  He has readmitted 2' pain & R knee buckling. Pt presents fowlers & reports moderate pain & R LE numbness.  He was able to report 1/3 spinal precautions & LSO wearing schedule.  Mr. Solorio required Assist x 2 for bed mobility, t/fs & short steps to chair.  He required Min A for LSO MILAD & slip on slippers.  At time of eval his spouse was present & states she had increased back pain after attempting to help him at home, she also has a walking CAM boot on her foot; therefore, spouse is not safe to assist pt at home as he complains of R knee buckling.  Mr. Solorio would benefit from cont'd OT tx & DC to inpatient rehab to improve his fxn, safety, & indep in ADLs.       ,

## (undated) DEVICE — GLV SURG BIOGEL LTX PF 6 1/2

## (undated) DEVICE — GLV SURG BIOGEL LTX PF 7 1/2

## (undated) DEVICE — INTENDED FOR TISSUE SEPARATION, AND OTHER PROCEDURES THAT REQUIRE A SHARP SURGICAL BLADE TO PUNCTURE OR CUT.: Brand: BARD-PARKER ® STAINLESS STEEL BLADES

## (undated) DEVICE — SPK10277 JACKSON/PRO-AXIS KIT: Brand: SPK10277 JACKSON/PRO-AXIS KIT

## (undated) DEVICE — CLTH CLENS READYCLEANSE PERI CARE PK/5

## (undated) DEVICE — 3.0MM PRECISION NEURO (MATCH HEAD)

## (undated) DEVICE — DRAPE,UTILITY,TAPE,15X26,STERILE: Brand: MEDLINE

## (undated) DEVICE — ANTIBACTERIAL UNDYED BRAIDED (POLYGLACTIN 910), SYNTHETIC ABSORBABLE SUTURE: Brand: COATED VICRYL

## (undated) DEVICE — ELECTRD BLD EDGE/INSUL1P 2.4X5.1MM STRL

## (undated) DEVICE — GLV SURG GRN DERMASSURE LF PF 7.5

## (undated) DEVICE — YANKAUER SUCTION INSTRUMENT WITHOUT CONTROL VENT, OPEN TIP, CLEAR: Brand: YANKAUER

## (undated) DEVICE — SPNG GZ WOVN 4X4IN 12PLY 10/BX STRL

## (undated) DEVICE — TROCAR TIP NITINOL GUIDEWIRE 18": Brand: INVICTUS

## (undated) DEVICE — GLV SURG SENSICARE W/ALOE PF LF 7.5 STRL

## (undated) DEVICE — PK TURNOVER RM ADV

## (undated) DEVICE — 4-PORT MANIFOLD: Brand: NEPTUNE 2

## (undated) DEVICE — CATH IV ANGIO FEP 12G 3IN LTBLU 10PK

## (undated) DEVICE — CONN FLX BREATHE CIRCT

## (undated) DEVICE — TOTAL TRAY, 16FR 10ML SIL FOLEY, URN: Brand: MEDLINE

## (undated) DEVICE — CVR UNIV C/ARM

## (undated) DEVICE — GLV SURG DERMASSURE GRN LF PF 8.0

## (undated) DEVICE — PK SPINE POST 30

## (undated) DEVICE — APPL CHLORAPREP HI/LITE 26ML ORNG

## (undated) DEVICE — DRP C/ARMOR